# Patient Record
Sex: FEMALE | Race: WHITE | URBAN - NONMETROPOLITAN AREA
[De-identification: names, ages, dates, MRNs, and addresses within clinical notes are randomized per-mention and may not be internally consistent; named-entity substitution may affect disease eponyms.]

---

## 2017-01-03 ENCOUNTER — MED REC SCAN ONLY (OUTPATIENT)
Dept: FAMILY MEDICINE CLINIC | Facility: CLINIC | Age: 27
End: 2017-01-03

## 2017-03-09 NOTE — TELEPHONE ENCOUNTER
Pt would like to know if Dr. Tanvi Shultz will refill the citalopram and clonazepam until she finds psychiatrist in area  Meds pended

## 2017-03-10 RX ORDER — CITALOPRAM 20 MG/1
20 TABLET ORAL DAILY
Qty: 30 TABLET | Refills: 1 | OUTPATIENT
Start: 2017-03-10

## 2017-03-10 RX ORDER — CLONAZEPAM 1 MG/1
1 TABLET ORAL 2 TIMES DAILY
Qty: 60 TABLET | Refills: 1
Start: 2017-03-10

## 2017-03-10 NOTE — TELEPHONE ENCOUNTER
Please advise patient that as I have not seen her in 2 years she will need an appointment prior to any medication refills.

## 2017-03-10 NOTE — TELEPHONE ENCOUNTER
Spoke with pt's mom re: below. Mom is aware of the conversation below, so I advised her of his message. Mom isn't sure if pt still has [facility] and if so, who she has listed as PCP with them? ?  Mom is checking her [facility] card now.

## 2017-03-13 NOTE — TELEPHONE ENCOUNTER
Future Appointments  Date Time Provider Carla Thurston   3/15/2017 1:45 PM Adryan Win, DO [facility]

## 2017-05-09 ENCOUNTER — TELEPHONE (OUTPATIENT)
Dept: FAMILY MEDICINE CLINIC | Facility: CLINIC | Age: 27
End: 2017-05-09

## 2017-05-12 NOTE — PATIENT INSTRUCTIONS
I reviewed signs and symptoms of depression and anxiety as well as conservative management. I would strongly encouraged patient to go through counseling however she is very reluctant to talk about her recent loss.   I have asked her to keep a journal and t

## 2017-05-12 NOTE — PROGRESS NOTES
Jesus Pablo is a 32year old female. Patient presents with:   Anxiety: F/U ---    HPI:   Pt would like her rx refilled, pt has been going to a psychiatrist in [facility], Dr Malloy  Pt now living at home  Pt had a [facility] @ 4 months  , 4 yr mobility, Nose: turbinates clear, no dc, Throat: no erythema, pnd, or lesions  NECK: supple,no adenopathy,no bruits, no thyromegaly  LUNGS: clear to auscultation, no w/r/r  CARDIO: RRR without murmur  GI: good BS's,no masses, HSM or tenderness  Neuro:+fine minutes of a 30 minute appointment spent discussing above    The patient indicates understanding of these issues and agrees to the plan. Shanice Valdovinos.  Rosie Guadarrama, RAMONAFP

## 2017-05-17 ENCOUNTER — TELEPHONE (OUTPATIENT)
Dept: FAMILY MEDICINE CLINIC | Facility: CLINIC | Age: 27
End: 2017-05-17

## 2017-05-17 NOTE — TELEPHONE ENCOUNTER
Pt calls. States she was prescribed Seroquel 25mg qhs at her 5/12 OV. Reports she took 1 dose and ~30 minutes later she became very cold, had chills but was also sweating. +vomiting and had a blue-green discoloration under her eyes for a short time.   Pt d

## 2017-06-12 ENCOUNTER — TELEPHONE (OUTPATIENT)
Dept: FAMILY MEDICINE CLINIC | Facility: CLINIC | Age: 27
End: 2017-06-12

## 2017-07-10 RX ORDER — CLONAZEPAM 1 MG/1
1 TABLET ORAL 2 TIMES DAILY PRN
Qty: 60 TABLET | Refills: 1 | Status: SHIPPED
Start: 2017-07-10 | End: 2017-09-04

## 2017-07-11 ENCOUNTER — TELEPHONE (OUTPATIENT)
Dept: FAMILY MEDICINE CLINIC | Facility: CLINIC | Age: 27
End: 2017-07-11

## 2017-07-11 NOTE — TELEPHONE ENCOUNTER
[facility] DID NOT GET FAX YESTERDAY FOR CLONAZEPAM   PLEASE CALL AND LEAVE VOICEMAIL FOR PHARMACIST

## 2017-07-19 RX ORDER — CLONAZEPAM 1 MG/1
TABLET ORAL
Qty: 60 TABLET | Refills: 0
Start: 2017-07-19

## 2017-07-20 ENCOUNTER — TELEPHONE (OUTPATIENT)
Dept: FAMILY MEDICINE CLINIC | Facility: CLINIC | Age: 27
End: 2017-07-20
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
## 2017-07-20 NOTE — TELEPHONE ENCOUNTER
Please advise patient that her clonazepam is a controlled substance. Is her responsibility to keep her prescriptions in a secure location.   It is okay to refill early this 1 time

## 2017-09-05 ENCOUNTER — TELEPHONE (OUTPATIENT)
Dept: FAMILY MEDICINE CLINIC | Facility: CLINIC | Age: 27
End: 2017-09-05

## 2017-09-05 RX ORDER — CLONAZEPAM 1 MG/1
TABLET ORAL
Qty: 60 TABLET | Refills: 0 | Status: SHIPPED
Start: 2017-09-05 | End: 2017-09-29

## 2017-10-03 RX ORDER — CLONAZEPAM 1 MG/1
TABLET ORAL
Qty: 60 TABLET | Refills: 0 | Status: SHIPPED
Start: 2017-10-03 | End: 2017-10-31

## 2017-10-31 RX ORDER — CLONAZEPAM 1 MG/1
TABLET ORAL
Qty: 60 TABLET | Refills: 0 | Status: SHIPPED
Start: 2017-10-31 | End: 2018-03-12 | Stop reason: ALTCHOICE

## 2017-10-31 RX ORDER — CITALOPRAM 40 MG/1
40 TABLET ORAL DAILY
Qty: 30 TABLET | Refills: 5 | Status: SHIPPED | OUTPATIENT
Start: 2017-10-31 | End: 2017-12-01

## 2017-10-31 NOTE — TELEPHONE ENCOUNTER
Last clonazepam RF #60 on 10/3/17  Last citalopram RF #30 w/ 5 refills on 5/12/17    Last OV 5/12/17

## 2017-12-01 NOTE — PATIENT INSTRUCTIONS
I reviewed patient's previous medication history and response to medications.   We will continue citalopram 40 mg daily  We will increase clonazepam to 1 mg 3 times daily  I discussed the importance of routine and sleep hygiene  Of asked the patient to call

## 2017-12-01 NOTE — PROGRESS NOTES
Tio Oliva is a 32year old female. Patient presents with:   Anxiety: MED CHECK--    HPI:   Sleeping better but still has delayed onset, patient states it may take her 1-2 hours to fall asleep but when she does she sleeps restfully, has tried trazodone, nourished,in no apparent distress, well hydrated  SKIN: no rashes,no suspicious lesions  ENT: TMs: intact, good mobility, Nose: turbinates clear, no dc, Throat: no erythema, pnd, or lesions  NECK: supple,no adenopathy,no bruits, no thyromegaly  LUNGS: sharan

## 2017-12-27 ENCOUNTER — TELEPHONE (OUTPATIENT)
Dept: FAMILY MEDICINE CLINIC | Facility: CLINIC | Age: 27
End: 2017-12-27

## 2017-12-27 NOTE — TELEPHONE ENCOUNTER
Citalopram Hydrobromide 40 MG Oral Tab   ClonazePAM 1 MG Oral Tab   PLEASE SEND REFILLS TO [facility]

## 2017-12-27 NOTE — TELEPHONE ENCOUNTER
Pt advised she has refills on citalopram available @ [facility]-----she needs to call them to get a refill ready. *last clonazepam RF was #90 on 12/1. Pt states she has several left. ADVISED DR. GUAJARDO WILL MOST LIKELY NOT REFILL THIS UNTIL 12/30.    PT IS

## 2017-12-28 RX ORDER — CLONAZEPAM 1 MG/1
1 TABLET ORAL 3 TIMES DAILY PRN
Qty: 90 TABLET | Refills: 0 | Status: SHIPPED | OUTPATIENT
Start: 2017-12-30 | End: 2018-01-03

## 2018-01-03 ENCOUNTER — TELEPHONE (OUTPATIENT)
Dept: FAMILY MEDICINE CLINIC | Facility: CLINIC | Age: 28
End: 2018-01-03

## 2018-01-03 RX ORDER — CLONAZEPAM 1 MG/1
1 TABLET ORAL 3 TIMES DAILY PRN
Qty: 90 TABLET | Refills: 0 | COMMUNITY
Start: 2018-01-03 | End: 2018-01-16

## 2018-01-03 NOTE — TELEPHONE ENCOUNTER
PHARMACY STATES THEY HAVE AN OLD RX FOR ClonazePAM.  THEY ADVISED PT TO CALL DRS OFFICE TO GET A NEW PX FOR 3 X A DAY. PHARMACY DID FILL RX BUT FOR 2 X A DAY. PT WANTS TO GET A RX FOR THE REMAINING DOSAGE.   PLEASE ADVISE

## 2018-01-03 NOTE — TELEPHONE ENCOUNTER
Pt calling because she only got #60 of Klonopin with BID directions from 12/30 refill. States it should have been #90 with TID directions. *PC to [facility], spoke with Joe. She states pt got #60 w/ BID sig on 10/31, then #90 w/ TID sig on 12/1.    A

## 2018-01-08 ENCOUNTER — TELEPHONE (OUTPATIENT)
Dept: FAMILY MEDICINE CLINIC | Facility: CLINIC | Age: 28
End: 2018-01-08

## 2018-01-08 RX ORDER — CLONAZEPAM 1 MG/1
TABLET ORAL
Qty: 90 TABLET | Refills: 0
Start: 2018-01-08

## 2018-01-08 NOTE — TELEPHONE ENCOUNTER
Pt got the ClonazePAM but the pharmacy messed it up and only gave her the script that has instructions for 2x daily. They told her to call Dr. Michelet Rae and see if he can do another script for the additional so she can take 3x daily as prescribed.      Pt is

## 2018-01-08 NOTE — TELEPHONE ENCOUNTER
Last office visit 12-1-17   Note in chart states: Pt only got #60 on 12/30/17----[facility]. Filled an old refill on file.  Pt did NOT get #90, so she will call when she needs a refill

## 2018-01-08 NOTE — TELEPHONE ENCOUNTER
Pt calls----states she will be traveling out of state for work. Jerry told her she needs to have refills on Klonopin. Advised to call us from where she is when she needs a refill, and we can send refill to a pharmacy close to her.

## 2018-01-16 RX ORDER — CLONAZEPAM 1 MG/1
1 TABLET ORAL 3 TIMES DAILY PRN
Qty: 90 TABLET | Refills: 0 | Status: SHIPPED
Start: 2018-01-16 | End: 2018-02-12

## 2018-01-16 NOTE — TELEPHONE ENCOUNTER
See below. Pt called on 1/3---said she only got #60 with BID directions instead of #90 with TID directions on 12/30. Pt is calling now for either the add'l #30 tabs OR a new script for #90.

## 2018-01-16 NOTE — TELEPHONE ENCOUNTER
ClonazePAM 1 MG    Jerry only gave her 60 instead of 90 so she was told to call when she was getting low. Please call in for the remaining 30. Pt it out of state for work, please send to pharmacy below.      Robert Glass in [place], [place]   Ph: [phone]

## 2018-02-12 RX ORDER — CITALOPRAM 40 MG/1
40 TABLET ORAL DAILY
Qty: 30 TABLET | Refills: 0 | Status: SHIPPED | OUTPATIENT
Start: 2018-02-12 | End: 2018-03-12

## 2018-02-14 RX ORDER — CLONAZEPAM 1 MG/1
1 TABLET ORAL 3 TIMES DAILY PRN
Qty: 90 TABLET | Refills: 0 | Status: SHIPPED
Start: 2018-02-14 | End: 2018-03-12

## 2018-02-14 NOTE — TELEPHONE ENCOUNTER
Sadie West needs clonazepam sent to [facility] at [address] [phone].

## 2018-02-20 ENCOUNTER — TELEPHONE (OUTPATIENT)
Dept: FAMILY MEDICINE CLINIC | Facility: CLINIC | Age: 28
End: 2018-02-20

## 2018-03-12 RX ORDER — CLONAZEPAM 1 MG/1
1 TABLET ORAL 3 TIMES DAILY PRN
Qty: 90 TABLET | Refills: 0 | Status: SHIPPED
Start: 2018-03-12 | End: 2018-04-11

## 2018-03-12 RX ORDER — CITALOPRAM 40 MG/1
40 TABLET ORAL DAILY
Qty: 30 TABLET | Refills: 0 | Status: SHIPPED | OUTPATIENT
Start: 2018-03-12 | End: 2018-04-24

## 2018-04-11 RX ORDER — CLONAZEPAM 1 MG/1
1 TABLET ORAL 3 TIMES DAILY PRN
Qty: 90 TABLET | Refills: 0 | Status: SHIPPED
Start: 2018-04-11 | End: 2018-05-08

## 2018-04-24 ENCOUNTER — TELEPHONE (OUTPATIENT)
Dept: FAMILY MEDICINE CLINIC | Facility: CLINIC | Age: 28
End: 2018-04-24

## 2018-04-24 RX ORDER — CITALOPRAM 40 MG/1
40 TABLET ORAL DAILY
Qty: 30 TABLET | Refills: 0 | Status: SHIPPED | OUTPATIENT
Start: 2018-04-24

## 2018-05-08 NOTE — TELEPHONE ENCOUNTER
Last RF #90 on 4/11. Pt knows she is a few days early. She called to scheduled an appt tomorrow for an eye issue, so she requested a refill while she was on the phone.  She knows it will be a few days

## 2018-05-09 RX ORDER — CLONAZEPAM 1 MG/1
1 TABLET ORAL 3 TIMES DAILY PRN
Qty: 90 TABLET | Refills: 0 | Status: SHIPPED | OUTPATIENT
Start: 2018-05-10 | End: 2018-06-09

## 2018-06-06 ENCOUNTER — TELEPHONE (OUTPATIENT)
Dept: FAMILY MEDICINE CLINIC | Facility: CLINIC | Age: 28
End: 2018-06-06

## 2018-06-06 NOTE — TELEPHONE ENCOUNTER
Citalopram Hydrobromide 40 MG Oral Tab  ClonazePAM 1 MG Oral Tab  Pt. Asking if Dr. Kate Alex will refill these for her. Call Jerry in [place]   Pt.  Has [facility] [employer] we can not take) she mentioned in the conversation that she has established w

## 2022-12-27 PROBLEM — F41.9 ANXIETY: Status: ACTIVE | Noted: 2019-05-03

## 2022-12-27 NOTE — PATIENT INSTRUCTIONS
I queried the [facility] for controlled substance use  I reviewed signs and symptoms as well as contributing factors and conservative management strategies for ADHD. Discussed organizational skills  Patient scores extremely high on both impulsivity and inattentive this as well as distractibility questionnaire. Would recommend resuming Adderall extended release 15 mg every morning. We will titrate slowly to lowest effective dose  I have asked patient to call or report an update in response to medication within the next 2 weeks  Patient may also benefit from the addition of quetiapine or other daily controller medications for anxiety  Discussed contributing factors for ankle pain. Patient was instructed in proper hamstring, calf, heel cord and plantar tendon stretches  I have asked patient to check with her insurance referral coordinator for participating podiatrist in the area  A total of 40 minutes was spent with the patient.   Please schedule follow-up to discuss other issues

## 2023-01-04 ENCOUNTER — MED REC SCAN ONLY (OUTPATIENT)
Dept: FAMILY MEDICINE CLINIC | Facility: CLINIC | Age: 33
End: 2023-01-04

## 2023-01-09 ENCOUNTER — PATIENT MESSAGE (OUTPATIENT)
Dept: FAMILY MEDICINE CLINIC | Facility: CLINIC | Age: 33
End: 2023-01-09

## 2023-01-09 ENCOUNTER — TELEPHONE (OUTPATIENT)
Dept: FAMILY MEDICINE CLINIC | Facility: CLINIC | Age: 33
End: 2023-01-09

## 2023-01-09 RX ORDER — DEXTROAMPHETAMINE SACCHARATE, AMPHETAMINE ASPARTATE MONOHYDRATE, DEXTROAMPHETAMINE SULFATE AND AMPHETAMINE SULFATE 5; 5; 5; 5 MG/1; MG/1; MG/1; MG/1
20 CAPSULE, EXTENDED RELEASE ORAL EVERY MORNING
Qty: 10 CAPSULE | Refills: 0 | Status: SHIPPED | OUTPATIENT
Start: 2023-01-09

## 2023-01-09 RX ORDER — DEXTROAMPHETAMINE SACCHARATE, AMPHETAMINE ASPARTATE MONOHYDRATE, DEXTROAMPHETAMINE SULFATE AND AMPHETAMINE SULFATE 5; 5; 5; 5 MG/1; MG/1; MG/1; MG/1
20 CAPSULE, EXTENDED RELEASE ORAL EVERY MORNING
Qty: 18 CAPSULE | Refills: 0 | Status: SHIPPED | OUTPATIENT
Start: 2023-01-09

## 2023-01-09 RX ORDER — DEXTROAMPHETAMINE SACCHARATE, AMPHETAMINE ASPARTATE MONOHYDRATE, DEXTROAMPHETAMINE SULFATE AND AMPHETAMINE SULFATE 5; 5; 5; 5 MG/1; MG/1; MG/1; MG/1
20 CAPSULE, EXTENDED RELEASE ORAL EVERY MORNING
Qty: 28 CAPSULE | Refills: 0 | Status: SHIPPED | OUTPATIENT
Start: 2023-01-09 | End: 2023-01-09

## 2023-01-09 NOTE — TELEPHONE ENCOUNTER
See pt's message below. She now wants #10 sent to [facility] on [address] and #18 to [facility]. I can't que up/ get both quantities ready, so here is the #10 to [facility].    Then you can send the #18 to [facility]

## 2023-01-09 NOTE — TELEPHONE ENCOUNTER
From: Jameel Joshi  Sent: 1/9/2023 1:12 PM CST  To: Mikaela Collins Clinical Staff  Subject: Meds. Okay, sooo, I called 4 pharmacies and they're all out. The [facility] [address] said they have 10 in stock, and to send the prescription over there.

## 2023-01-09 NOTE — TELEPHONE ENCOUNTER
From: [place]  To: Thong Salmon. Easton Brewer DO  Sent: 1/9/2023 10:45 AM CST  Subject: Meds. Trey, Dr. Easton Brewer! I'm still thinking 15mg. for the adderall is a little too low. It does help a tiny bit, though. Also, I do need a refill. Just let me know the plan.  Thank you!   -Lolis Mora

## 2023-02-06 RX ORDER — DEXTROAMPHETAMINE SACCHARATE, AMPHETAMINE ASPARTATE MONOHYDRATE, DEXTROAMPHETAMINE SULFATE AND AMPHETAMINE SULFATE 5; 5; 5; 5 MG/1; MG/1; MG/1; MG/1
20 CAPSULE, EXTENDED RELEASE ORAL EVERY MORNING
Qty: 28 CAPSULE | Refills: 0 | Status: SHIPPED | OUTPATIENT
Start: 2023-02-06 | End: 2023-02-07

## 2023-02-07 ENCOUNTER — PATIENT MESSAGE (OUTPATIENT)
Dept: FAMILY MEDICINE CLINIC | Facility: CLINIC | Age: 33
End: 2023-02-07

## 2023-02-07 RX ORDER — DEXTROAMPHETAMINE SACCHARATE, AMPHETAMINE ASPARTATE MONOHYDRATE, DEXTROAMPHETAMINE SULFATE AND AMPHETAMINE SULFATE 5; 5; 5; 5 MG/1; MG/1; MG/1; MG/1
20 CAPSULE, EXTENDED RELEASE ORAL EVERY MORNING
Qty: 28 CAPSULE | Refills: 0 | Status: SHIPPED | OUTPATIENT
Start: 2023-02-07

## 2023-02-07 NOTE — TELEPHONE ENCOUNTER
From: [place]  To: Thong Salmon. Easton Brewer DO  Sent: 2/7/2023 1:23 PM CST  Subject: Gregg Headley, Dr. Easton Brewer. I called the office yesterday, and told them that j needed the adderall sent to [facility] in [place].  It was sent to [facility] again, and they are out

## 2023-02-22 RX ORDER — CLONAZEPAM 0.5 MG/1
0.5 TABLET ORAL 2 TIMES DAILY PRN
Refills: 0 | OUTPATIENT
Start: 2023-02-22

## 2023-02-22 NOTE — TELEPHONE ENCOUNTER
Clonazepam refusal reason: \"Prescriber not at this practice\"    Please advise what to tell pt for reason of denial.    Thank you.

## 2023-02-22 NOTE — TELEPHONE ENCOUNTER
Clonazepam refused because it has been prescribed by another provider, most recently February 3.   I am not going to prescribe it

## 2023-02-23 RX ORDER — CLONAZEPAM 0.5 MG/1
0.5 TABLET ORAL 2 TIMES DAILY PRN
Refills: 0 | OUTPATIENT
Start: 2023-02-23

## 2023-02-23 NOTE — TELEPHONE ENCOUNTER
Clonazepam 0.5 MG oral tab [facility]    Last office visit: 12/27/22  No future appointments.   Last labs: 5/3/22

## 2023-02-23 NOTE — TELEPHONE ENCOUNTER
Dr. Chemo Jensen denied your refill request. His response is \"Clonazepam refuled because it has been prescribed by another provider, most recently on Feb. 3.  I am not going to prescribe this. \"

## 2023-02-24 NOTE — TELEPHONE ENCOUNTER
All refills need to be forwarded to the prescriber.   If she is running out early or requests a longer refill, that needs to be relayed to the prescriber

## 2023-02-27 ENCOUNTER — TELEPHONE (OUTPATIENT)
Dept: FAMILY MEDICINE CLINIC | Facility: CLINIC | Age: 33
End: 2023-02-27

## 2023-02-27 NOTE — TELEPHONE ENCOUNTER
[employer] message sent to pt re: below. *PLEASE SEE HER LAST MESSAGE BACK TO YOU    **PHONE CALL TO PT TO DISCUSS THIS FURTHER, as there seems to be some sort of miscommunication. Pt states she was seeing Alvin Coffey, nurse practitioner, part of [facility] in [facility]. Reports her insurance changed, so she has now changed to Dr. Veronique Robbins as her PCP. States that last refill on 2/3 was for #40 to last her 20 days. She then expected Dr. Veronique Robbins to take over refilling it. States after not being able to get it refilled last week by us, she contacted Cheryl nicholas to ask for 1 last refill because her new PCP wouldn't do it. *Pt states Alvin Campbellalsherron did send a refill TODAY for #40, to last the next 20days. Pt will c/b when she runs out and hopes that you will then prescribe it for her. *DO YOU WANT TO SEE HER BEFORE THEN TO DISCUSS FURTHER?  HER LAST APPT WAS ON 12/27/22

## 2023-02-27 NOTE — TELEPHONE ENCOUNTER
I did not refill the clonazepam after the December appointment as patient states she was no longer seeing this provider and then 2 additional refills showed up under that providers name. There should only be 1 provider refilling controlled medication.   If she would like me to continue, then I will take over that prescription

## 2023-03-13 RX ORDER — DEXTROAMPHETAMINE SACCHARATE, AMPHETAMINE ASPARTATE MONOHYDRATE, DEXTROAMPHETAMINE SULFATE AND AMPHETAMINE SULFATE 5; 5; 5; 5 MG/1; MG/1; MG/1; MG/1
20 CAPSULE, EXTENDED RELEASE ORAL EVERY MORNING
Qty: 28 CAPSULE | Refills: 0 | Status: SHIPPED | OUTPATIENT
Start: 2023-03-13

## 2023-03-13 NOTE — TELEPHONE ENCOUNTER
No protocol    Last office visit:  12/27/22  Last refill:  02/07/22  #28, no refills  Last lab:  05/03/22    No future appointments.

## 2023-03-22 RX ORDER — CLONAZEPAM 0.5 MG/1
0.5 TABLET ORAL 2 TIMES DAILY PRN
Qty: 30 TABLET | Refills: 0 | Status: SHIPPED | OUTPATIENT
Start: 2023-03-22

## 2023-03-31 ENCOUNTER — OFFICE VISIT (OUTPATIENT)
Dept: FAMILY MEDICINE CLINIC | Facility: CLINIC | Age: 33
End: 2023-03-31
Payer: MEDICAID

## 2023-03-31 VITALS
WEIGHT: 225 LBS | RESPIRATION RATE: 18 BRPM | DIASTOLIC BLOOD PRESSURE: 80 MMHG | HEIGHT: 62 IN | SYSTOLIC BLOOD PRESSURE: 120 MMHG | OXYGEN SATURATION: 97 % | TEMPERATURE: 99 F | HEART RATE: 105 BPM | BODY MASS INDEX: 41.41 KG/M2

## 2023-03-31 DIAGNOSIS — R35.0 URINARY FREQUENCY: Primary | ICD-10-CM

## 2023-03-31 DIAGNOSIS — F90.2 ATTENTION DEFICIT HYPERACTIVITY DISORDER (ADHD), COMBINED TYPE: ICD-10-CM

## 2023-03-31 LAB
BILIRUBIN: NEGATIVE
GLUCOSE (URINE DIPSTICK): NEGATIVE MG/DL
KETONES (URINE DIPSTICK): NEGATIVE MG/DL
MULTISTIX LOT#: ABNORMAL NUMERIC
NITRITE, URINE: NEGATIVE
PH, URINE: 6.5 (ref 4.5–8)
PROTEIN (URINE DIPSTICK): NEGATIVE MG/DL
SPECIFIC GRAVITY: 1.01 (ref 1–1.03)
URINE-COLOR: YELLOW
UROBILINOGEN,SEMI-QN: 0.2 MG/DL (ref 0–1.9)

## 2023-03-31 PROCEDURE — 87086 URINE CULTURE/COLONY COUNT: CPT | Performed by: FAMILY MEDICINE

## 2023-03-31 RX ORDER — SULFAMETHOXAZOLE AND TRIMETHOPRIM 800; 160 MG/1; MG/1
1 TABLET ORAL 2 TIMES DAILY
Qty: 6 TABLET | Refills: 0 | Status: SHIPPED | OUTPATIENT
Start: 2023-03-31 | End: 2023-04-03

## 2023-03-31 NOTE — PATIENT INSTRUCTIONS
Urine results reviewed. Urine sent for culture  We will start Bactrim DS twice daily x3 days  Push fluids, avoid holding urine, void after sex  Will increase Adderall extended release to 25 mg every morning with next refill. If necessary a second dose of an immediate release preparation may be used on an as-needed basis.

## 2023-04-04 RX ORDER — CLONAZEPAM 0.5 MG/1
0.5 TABLET ORAL 2 TIMES DAILY PRN
Qty: 56 TABLET | Refills: 0 | Status: SHIPPED | OUTPATIENT
Start: 2023-04-04

## 2023-04-04 NOTE — TELEPHONE ENCOUNTER
Clonazepam 0.5 MG oral tab    Last office visit: 3/31/23  No future appointments.   Last filled: 3/22/23  #30 with 0 refills

## 2023-04-14 ENCOUNTER — TELEPHONE (OUTPATIENT)
Dept: FAMILY MEDICINE CLINIC | Facility: CLINIC | Age: 33
End: 2023-04-14

## 2023-04-14 RX ORDER — DEXTROAMPHETAMINE SACCHARATE, AMPHETAMINE ASPARTATE MONOHYDRATE, DEXTROAMPHETAMINE SULFATE AND AMPHETAMINE SULFATE 6.25; 6.25; 6.25; 6.25 MG/1; MG/1; MG/1; MG/1
25 CAPSULE, EXTENDED RELEASE ORAL EVERY MORNING
Qty: 28 CAPSULE | Refills: 0 | Status: SHIPPED | OUTPATIENT
Start: 2023-04-14

## 2023-04-14 NOTE — TELEPHONE ENCOUNTER
Patient needs a refill on Adderall currently taking 20 mg daily, she states you were going to increase the dose?  Please advise

## 2023-04-14 NOTE — TELEPHONE ENCOUNTER
Amphetamine-Dextroamphet  - Pt states dr was going to increase the dosage first.  -  [facility]

## 2023-04-20 ENCOUNTER — TELEPHONE (OUTPATIENT)
Dept: FAMILY MEDICINE CLINIC | Facility: CLINIC | Age: 33
End: 2023-04-20

## 2023-04-21 ENCOUNTER — MED REC SCAN ONLY (OUTPATIENT)
Dept: FAMILY MEDICINE CLINIC | Facility: CLINIC | Age: 33
End: 2023-04-21

## 2023-04-28 NOTE — TELEPHONE ENCOUNTER
clonazePAM 0.5 MG Oral Tab    LOV  3-31-23    LAST RX  4-4-23  #56    Next OV  No future appointments.

## 2023-04-29 RX ORDER — CLONAZEPAM 0.5 MG/1
0.5 TABLET ORAL 2 TIMES DAILY PRN
Qty: 56 TABLET | Refills: 0 | Status: SHIPPED | OUTPATIENT
Start: 2023-04-29

## 2023-05-02 RX ORDER — CITALOPRAM 40 MG/1
40 TABLET ORAL DAILY
Qty: 30 TABLET | Refills: 0 | Status: SHIPPED | OUTPATIENT
Start: 2023-05-02

## 2023-05-13 NOTE — TELEPHONE ENCOUNTER
Amphetamine-Dextroamphet ER 25 MG Oral Capsule SR 24 Hr    Last office visit:  12/27/22    No future appointments.   Last filled:  4/14/23  #28  With 0 refills   Last labs:

## 2023-05-15 RX ORDER — DEXTROAMPHETAMINE SACCHARATE, AMPHETAMINE ASPARTATE MONOHYDRATE, DEXTROAMPHETAMINE SULFATE AND AMPHETAMINE SULFATE 6.25; 6.25; 6.25; 6.25 MG/1; MG/1; MG/1; MG/1
25 CAPSULE, EXTENDED RELEASE ORAL EVERY MORNING
Qty: 28 CAPSULE | Refills: 0 | Status: SHIPPED | OUTPATIENT
Start: 2023-05-15

## 2023-05-26 ENCOUNTER — PATIENT MESSAGE (OUTPATIENT)
Dept: FAMILY MEDICINE CLINIC | Facility: CLINIC | Age: 33
End: 2023-05-26

## 2023-05-27 RX ORDER — CLONAZEPAM 0.5 MG/1
0.5 TABLET ORAL 2 TIMES DAILY PRN
Qty: 56 TABLET | Refills: 0 | Status: CANCELLED | OUTPATIENT
Start: 2023-05-27

## 2023-05-27 RX ORDER — CLONAZEPAM 0.5 MG/1
0.5 TABLET ORAL 2 TIMES DAILY PRN
Qty: 56 TABLET | Refills: 0 | Status: SHIPPED | OUTPATIENT
Start: 2023-05-27

## 2023-05-27 RX ORDER — CLONAZEPAM 0.5 MG/1
TABLET ORAL
Qty: 56 TABLET | Refills: 0 | OUTPATIENT
Start: 2023-05-27

## 2023-05-27 NOTE — TELEPHONE ENCOUNTER
From: Koki Farias  To: Vandana Jones DO  Sent: 5/26/2023 3:44 PM CDT  Subject: Medication    Dr. Tavia Jones, I am wondering what is going on with my clonazepam refill. I am out, have called twice and sent multiple refill requests to you. Please, get back to me. Thanks.     Pura Terrazas

## 2023-06-12 ENCOUNTER — TELEPHONE (OUTPATIENT)
Dept: FAMILY MEDICINE CLINIC | Facility: CLINIC | Age: 33
End: 2023-06-12

## 2023-06-12 RX ORDER — DEXTROAMPHETAMINE SACCHARATE, AMPHETAMINE ASPARTATE MONOHYDRATE, DEXTROAMPHETAMINE SULFATE AND AMPHETAMINE SULFATE 6.25; 6.25; 6.25; 6.25 MG/1; MG/1; MG/1; MG/1
25 CAPSULE, EXTENDED RELEASE ORAL EVERY MORNING
Qty: 28 CAPSULE | Refills: 0 | Status: CANCELLED | OUTPATIENT
Start: 2023-06-12

## 2023-06-13 RX ORDER — DEXTROAMPHETAMINE SACCHARATE, AMPHETAMINE ASPARTATE, DEXTROAMPHETAMINE SULFATE AND AMPHETAMINE SULFATE 5; 5; 5; 5 MG/1; MG/1; MG/1; MG/1
20 TABLET ORAL 2 TIMES DAILY
Qty: 56 TABLET | Refills: 0 | Status: SHIPPED | OUTPATIENT
Start: 2023-06-13 | End: 2023-07-11

## 2023-06-13 NOTE — TELEPHONE ENCOUNTER
Spoke with patient - informed of change in dosage and time of medication. Twice a day now - not extended release  Verbalized understanding.   Electronic PA submitted

## 2023-06-13 NOTE — TELEPHONE ENCOUNTER
Please advise patient that I would recommend changing to twice daily dosing of immediate release Adderall 20 mg.  Prescription sent to the pharmacy

## 2023-06-14 ENCOUNTER — TELEPHONE (OUTPATIENT)
Dept: FAMILY MEDICINE CLINIC | Facility: CLINIC | Age: 33
End: 2023-06-14

## 2023-06-14 NOTE — TELEPHONE ENCOUNTER
Received approved prior authorization for Adderall 20mg effective through 6/14/23    Case # PA - [id number]

## 2023-06-16 NOTE — TELEPHONE ENCOUNTER
Additional information sent through cover my meds  [id number] - PA Case ID: [id number] - Rx #: [id number]

## 2023-06-21 NOTE — TELEPHONE ENCOUNTER
CLONAZEPAM 0.5 MG Oral Tab    LOV  3-31-23    LAST RX  5-27-23  #56    Next OV No future appointments.

## 2023-06-23 RX ORDER — CLONAZEPAM 0.5 MG/1
TABLET ORAL
Qty: 56 TABLET | Refills: 0 | Status: SHIPPED | OUTPATIENT
Start: 2023-06-23

## 2023-07-12 RX ORDER — DEXTROAMPHETAMINE SACCHARATE, AMPHETAMINE ASPARTATE, DEXTROAMPHETAMINE SULFATE AND AMPHETAMINE SULFATE 5; 5; 5; 5 MG/1; MG/1; MG/1; MG/1
20 TABLET ORAL 2 TIMES DAILY
Qty: 56 TABLET | Refills: 0 | Status: SHIPPED | OUTPATIENT
Start: 2023-07-12 | End: 2023-08-09

## 2023-07-12 NOTE — TELEPHONE ENCOUNTER
Requested Prescriptions     Pending Prescriptions Disp Refills    amphetamine-dextroamphetamine 20 MG Oral Tab 56 tablet 0     Sig: Take 1 tablet (20 mg total) by mouth 2 (two) times daily for 28 days. Last refill 6/13/23 #56  LOV 3/31/23  No future appointments.

## 2023-07-19 RX ORDER — CITALOPRAM 40 MG/1
40 TABLET ORAL DAILY
Qty: 90 TABLET | Refills: 3 | Status: SHIPPED | OUTPATIENT
Start: 2023-07-19

## 2023-07-19 NOTE — TELEPHONE ENCOUNTER
citalopram 40 MG Oral Tab     LOV  3-31-23    LAST RX  5-2-23  #30    Next OV No future appointments.     Routed to provider

## 2023-07-19 NOTE — TELEPHONE ENCOUNTER
clonazePAM 0.5 MG Oral Tab     LOV  3-31-23    LAST RX  6-23-23  #56    Next OV  No future appointments. Please see pt comment   Patient Comment: Can this be upped a tiny bit?     Routed to provider

## 2023-07-20 RX ORDER — CLONAZEPAM 0.5 MG/1
0.5 TABLET ORAL 2 TIMES DAILY PRN
Qty: 56 TABLET | Refills: 0 | Status: SHIPPED | OUTPATIENT
Start: 2023-07-20

## 2023-08-07 NOTE — TELEPHONE ENCOUNTER
amphetamine-dextroamphetamine 20 MG Oral Tab     Last office visit:  3/31/23  No future appointments.   Last filled:  7/12/23  #56 with 0 refills   Last labs:      Camryn Lustkayla and set up a physical.   Future Appointments   Date Time Provider Carla Thurston   9/7/2023 10:45 AM Pooja Hernandez., DO [facility] none

## 2023-08-08 RX ORDER — DEXTROAMPHETAMINE SACCHARATE, AMPHETAMINE ASPARTATE, DEXTROAMPHETAMINE SULFATE AND AMPHETAMINE SULFATE 5; 5; 5; 5 MG/1; MG/1; MG/1; MG/1
20 TABLET ORAL 2 TIMES DAILY
Qty: 56 TABLET | Refills: 0 | Status: SHIPPED | OUTPATIENT
Start: 2023-08-08 | End: 2023-09-05

## 2023-08-16 RX ORDER — CLONAZEPAM 0.5 MG/1
0.5 TABLET ORAL 2 TIMES DAILY PRN
Qty: 56 TABLET | Refills: 0 | Status: SHIPPED | OUTPATIENT
Start: 2023-08-16

## 2023-09-09 ENCOUNTER — PATIENT MESSAGE (OUTPATIENT)
Dept: FAMILY MEDICINE CLINIC | Facility: CLINIC | Age: 33
End: 2023-09-09

## 2023-09-11 ENCOUNTER — TELEPHONE (OUTPATIENT)
Dept: FAMILY MEDICINE CLINIC | Facility: CLINIC | Age: 33
End: 2023-09-11

## 2023-09-11 RX ORDER — DEXTROAMPHETAMINE SACCHARATE, AMPHETAMINE ASPARTATE, DEXTROAMPHETAMINE SULFATE AND AMPHETAMINE SULFATE 5; 5; 5; 5 MG/1; MG/1; MG/1; MG/1
20 TABLET ORAL 2 TIMES DAILY
Qty: 56 TABLET | Refills: 0 | Status: SHIPPED | OUTPATIENT
Start: 2023-09-11 | End: 2023-10-09

## 2023-09-11 NOTE — TELEPHONE ENCOUNTER
Pt also sent a [facility] requesting this.   That encounter was forwarded to Dr. Char Siemens

## 2023-09-13 RX ORDER — CLONAZEPAM 0.5 MG/1
0.5 TABLET ORAL 2 TIMES DAILY PRN
Qty: 56 TABLET | Refills: 0 | Status: SHIPPED | OUTPATIENT
Start: 2023-09-13

## 2023-10-09 RX ORDER — DEXTROAMPHETAMINE SACCHARATE, AMPHETAMINE ASPARTATE, DEXTROAMPHETAMINE SULFATE AND AMPHETAMINE SULFATE 5; 5; 5; 5 MG/1; MG/1; MG/1; MG/1
20 TABLET ORAL 2 TIMES DAILY
Qty: 56 TABLET | Refills: 0 | Status: SHIPPED | OUTPATIENT
Start: 2023-10-09 | End: 2023-11-06

## 2023-10-09 NOTE — TELEPHONE ENCOUNTER
amphetamine-dextroamphetamine 20 MG Oral Tab    Last office visit:  12/27/22    No future appointments.   Last filled:  9/11/23  #56 with 0 refills   Last labs:

## 2023-10-09 NOTE — TELEPHONE ENCOUNTER
CLONAZEPAM 0.5 MG Oral Tab     Last office visit:  12/27/22  No future appointments.   Last filled:  9/13/23  #56 with 0 refills   Last labs:

## 2023-10-10 RX ORDER — CLONAZEPAM 0.5 MG/1
0.5 TABLET ORAL 2 TIMES DAILY PRN
Qty: 56 TABLET | Refills: 0 | Status: SHIPPED | OUTPATIENT
Start: 2023-10-10 | End: 2023-10-13 | Stop reason: RX

## 2023-10-13 ENCOUNTER — TELEPHONE (OUTPATIENT)
Dept: FAMILY MEDICINE CLINIC | Facility: CLINIC | Age: 33
End: 2023-10-13

## 2023-10-13 RX ORDER — CLONAZEPAM 0.5 MG/1
0.5 TABLET ORAL 2 TIMES DAILY PRN
Qty: 56 TABLET | Refills: 0 | Status: SHIPPED | OUTPATIENT
Start: 2023-10-13 | End: 2023-10-16

## 2023-10-13 NOTE — TELEPHONE ENCOUNTER
clonazePAM - This medication was refilled a few days ago but it is out of stock. The pharmacy has 1 mg. Can we resend for 1 mg & she will cut in half? - [facility] [place].

## 2023-10-13 NOTE — TELEPHONE ENCOUNTER
Clonazepam 1 mg tablets are not to be cut. I cannot prescribe it as a half a tablet. I can prescribe brand-name Klonopin or she can try a different pharmacy. Michael Philip.  Juju Sloan

## 2023-10-16 RX ORDER — CLONAZEPAM 0.5 MG/1
0.5 TABLET ORAL 2 TIMES DAILY PRN
Qty: 56 TABLET | Refills: 0 | Status: SHIPPED | OUTPATIENT
Start: 2023-10-16 | End: 2023-11-13

## 2023-10-16 RX ORDER — CLONAZEPAM 0.5 MG/1
0.5 TABLET ORAL 2 TIMES DAILY PRN
Qty: 56 TABLET | Refills: 0 | OUTPATIENT
Start: 2023-10-16 | End: 2023-11-13

## 2023-10-16 NOTE — TELEPHONE ENCOUNTER
Last OV 3/31/23    No future appts scheduled   (Pt has canceled her last 2 px appts---9/7/23 and 9/27/23)     A refill was sent to [facility] on 10/13/23, bit they didn't have in stock.     Pt states [facility] in [place] has this available

## 2023-10-18 ENCOUNTER — TELEPHONE (OUTPATIENT)
Dept: FAMILY MEDICINE CLINIC | Facility: CLINIC | Age: 33
End: 2023-10-18

## 2023-10-18 RX ORDER — ACETAMINOPHEN AND CODEINE PHOSPHATE 300; 30 MG/1; MG/1
1 TABLET ORAL EVERY 6 HOURS PRN
Qty: 12 TABLET | Refills: 0 | Status: SHIPPED | OUTPATIENT
Start: 2023-10-18

## 2023-10-18 NOTE — TELEPHONE ENCOUNTER
PC to pt. Spoke with pt. Pt states she discovered one of her lower right molares had cracked. Pt states she doesn't know when it happened as she found it when she was brushing her teeth and one of the bristles got stuck. Pt describes the pain as a dull ache all the time with an occasional sharp pain. Pt states she has been having headaches for about a week but isn't sure if that is related to medication withdrawal as her pharmacy has been out of two of her medications. Pt states this tooth was fixed 2 years ago. Pt mentioned a wisdom tooth had come in 2 weeks ago but crumbled while eating. Pt denies any pain but does have one piece that is sticking out that cuts her cheek. Pt states she does see pus on the right cheek. Pt denies any facial swelling. Pt mentioned her right ear does get hot occasionally. Pt is taking aspirin and ibuprofen with some relief but doesn't last. Pt describes her pain level at a 6 and occasionally feels the pain down her jaw. Pt is using Sensodyne toothpaste. Pt is wondering if there is anything else she can be doing until she can hear back from her insurance re: finding a dentist.    Please advise.

## 2023-10-18 NOTE — TELEPHONE ENCOUNTER
Pt tooth cracked in half, she is currently waiting on insurance to get back to her in regards to what dentist she can see and is wanting to know if Dr can recommend what she take in the meantime to reduce the pain.

## 2023-10-18 NOTE — TELEPHONE ENCOUNTER
She ultimately needs to follow-up with her dentist.  Ibuprofen 800 mg every 8 hours as needed for pain, avoid overly cold liquids. Continue with the Sensodyne toothpaste, she may use Tylenol with codeine for severe pain.   Please warn of sedation with codeine as well as possible nausea

## 2023-10-20 ENCOUNTER — TELEPHONE (OUTPATIENT)
Dept: FAMILY MEDICINE CLINIC | Facility: CLINIC | Age: 33
End: 2023-10-20

## 2023-10-20 NOTE — TELEPHONE ENCOUNTER
Received a fax from [employer] stating that a prior auth is needed for patient's Clonazepam. Prior auth submitted via epic. Awaiting authorization determination.      If need to go onto covermymeds, the arreaga is:    Navin Iraheta  11/21/1990

## 2023-10-20 NOTE — TELEPHONE ENCOUNTER
Received a fax from [facility] stating a prior Mary Dial is not needed at this time. Pharmacy notified.

## 2023-10-25 RX ORDER — ACETAMINOPHEN AND CODEINE PHOSPHATE 300; 30 MG/1; MG/1
1 TABLET ORAL EVERY 6 HOURS PRN
Qty: 12 TABLET | Refills: 0 | Status: SHIPPED | OUTPATIENT
Start: 2023-10-25

## 2023-10-26 ENCOUNTER — TELEPHONE (OUTPATIENT)
Dept: FAMILY MEDICINE CLINIC | Facility: CLINIC | Age: 33
End: 2023-10-26

## 2023-10-31 RX ORDER — ACETAMINOPHEN AND CODEINE PHOSPHATE 300; 30 MG/1; MG/1
1 TABLET ORAL EVERY 6 HOURS PRN
Qty: 12 TABLET | Refills: 0 | Status: SHIPPED | OUTPATIENT
Start: 2023-10-31

## 2023-10-31 NOTE — TELEPHONE ENCOUNTER
Last refill: 10/25/23 #12 w/ 0 refills    Last OV: 3/31/23  Last labs: Nothing ordered by us    No future appointments. Comment on the last request dated 10/25/23[de-identified]   Patient Comment: I will not be able to see my dentist til November 8th. I was wondering if this could be refilled?

## 2023-11-07 RX ORDER — ACETAMINOPHEN AND CODEINE PHOSPHATE 300; 30 MG/1; MG/1
1 TABLET ORAL EVERY 6 HOURS PRN
Qty: 12 TABLET | Refills: 0 | Status: SHIPPED | OUTPATIENT
Start: 2023-11-07

## 2023-11-07 NOTE — TELEPHONE ENCOUNTER
LOV  03/31/2023    LAST LAB  05/03/2023    LAST RX  10/31/2023    Next OV No future appointments. PROTOCOL  Refills have been requested for the following medications:         acetaminophen-codeine (TYLENOL WITH CODEINE #3) 300-30 MG Oral Tab [Luis Ellis]       Patient Comment: I see the dentist in 3 days     Preferred pharmacy: [facility] [address] [place], [place] [address]dey[facility] [address] [place] [address] ([address]), [phone], [phone]

## 2023-11-13 RX ORDER — CLONAZEPAM 0.5 MG/1
0.5 TABLET ORAL 2 TIMES DAILY PRN
Qty: 56 TABLET | Refills: 0 | Status: SHIPPED | OUTPATIENT
Start: 2023-11-13 | End: 2023-12-11

## 2023-11-13 NOTE — TELEPHONE ENCOUNTER
OV 03/31  REFILL 10/16 #56    Requested Prescriptions     Pending Prescriptions Disp Refills    clonazePAM (KLONOPIN) 0.5 MG Oral Tab 56 tablet 0     Sig: Take 1 tablet (0.5 mg total) by mouth 2 (two) times daily as needed for Anxiety. No future appointments.

## 2023-11-20 RX ORDER — DEXTROAMPHETAMINE SACCHARATE, AMPHETAMINE ASPARTATE, DEXTROAMPHETAMINE SULFATE AND AMPHETAMINE SULFATE 5; 5; 5; 5 MG/1; MG/1; MG/1; MG/1
20 TABLET ORAL 2 TIMES DAILY
Qty: 56 TABLET | Refills: 0 | Status: SHIPPED | OUTPATIENT
Start: 2023-11-20 | End: 2023-12-18

## 2023-11-20 NOTE — TELEPHONE ENCOUNTER
[facility] sent back to pt re: below
Last OV 3/31/23  Scheduled for cpx on 11/27/23    Last RF #12 on 11/7/23
Last message was that patient was going to see her dentist on 11/8 for dental pain for which she has been taking Tylenol 3.   Please get an update
Statement Selected

## 2023-11-21 RX ORDER — ACETAMINOPHEN AND CODEINE PHOSPHATE 300; 30 MG/1; MG/1
1 TABLET ORAL
Qty: 15 TABLET | Refills: 0 | Status: SHIPPED | OUTPATIENT
Start: 2023-11-21 | End: 2023-12-06

## 2023-12-02 NOTE — TELEPHONE ENCOUNTER
Last office visit: 3/31/23  Last refill: 11/21/23 qty: 15  Future Appointments   Date Time Provider Carla Thurston   12/21/2023  2:30 PM Bib Canela., DO [facility]

## 2023-12-04 RX ORDER — ACETAMINOPHEN AND CODEINE PHOSPHATE 300; 30 MG/1; MG/1
1 TABLET ORAL
Qty: 15 TABLET | Refills: 0 | OUTPATIENT
Start: 2023-12-04 | End: 2023-12-19

## 2023-12-04 NOTE — TELEPHONE ENCOUNTER
Calling patient-she was unable to get to [facility]. They only have one vehicle. Boyfriend cannot take off work, he just got a new job and cannot take off early or at all for 90 days. Pt is still having pain-RT side wisdom tooth came through and it broke-cut cheek open. Molar on bottom is cracked in half and having a lot of pain in there and into jaw. She is limited on the dentists she can see because of her insurance.

## 2023-12-05 RX ORDER — ACETAMINOPHEN AND CODEINE PHOSPHATE 300; 30 MG/1; MG/1
1 TABLET ORAL
Qty: 15 TABLET | Refills: 0 | Status: SHIPPED | OUTPATIENT
Start: 2023-12-05 | End: 2023-12-20

## 2023-12-05 NOTE — TELEPHONE ENCOUNTER
Please have patient try ibuprofen up to 800 mg 3 times daily which may be more effective than Tylenol with codeine.   I will give her a small supply of Tylenol 3

## 2023-12-22 ENCOUNTER — PATIENT MESSAGE (OUTPATIENT)
Dept: FAMILY MEDICINE CLINIC | Facility: CLINIC | Age: 33
End: 2023-12-22

## 2023-12-23 RX ORDER — DEXTROAMPHETAMINE SACCHARATE, AMPHETAMINE ASPARTATE, DEXTROAMPHETAMINE SULFATE AND AMPHETAMINE SULFATE 5; 5; 5; 5 MG/1; MG/1; MG/1; MG/1
20 TABLET ORAL 2 TIMES DAILY
Qty: 56 TABLET | Refills: 0 | Status: SHIPPED | OUTPATIENT
Start: 2023-12-23 | End: 2024-01-20

## 2023-12-23 RX ORDER — ACETAMINOPHEN AND CODEINE PHOSPHATE 300; 30 MG/1; MG/1
1 TABLET ORAL
Qty: 10 TABLET | Refills: 0 | Status: SHIPPED | OUTPATIENT
Start: 2023-12-23 | End: 2024-01-07

## 2023-12-23 NOTE — TELEPHONE ENCOUNTER
Tylenol #3: 12/5/23 #15 w/ 0 refills  Adderall: 11/20/23 #56 w/ 0 refills    Last OV: 3/31/23  Last labs: nothing routine    Future Appointments   Date Time Provider Carla Thurston   1/2/2024  4:00 PM Bib Suero, DO [facility]         [facility] sent asking pt if she has been using ibuprofen as recommended by Dr. Khadijah Iglesias the last time she requested this refill.

## 2023-12-29 DIAGNOSIS — R68.84 JAW PAIN: Primary | ICD-10-CM

## 2024-01-02 ENCOUNTER — OFFICE VISIT (OUTPATIENT)
Dept: FAMILY MEDICINE CLINIC | Facility: CLINIC | Age: 34
End: 2024-01-02
Payer: MEDICAID

## 2024-01-02 VITALS
HEART RATE: 100 BPM | OXYGEN SATURATION: 98 % | SYSTOLIC BLOOD PRESSURE: 138 MMHG | BODY MASS INDEX: 34.02 KG/M2 | DIASTOLIC BLOOD PRESSURE: 88 MMHG | WEIGHT: 192 LBS | HEIGHT: 63 IN | TEMPERATURE: 98 F

## 2024-01-02 DIAGNOSIS — F41.1 GENERALIZED ANXIETY DISORDER: ICD-10-CM

## 2024-01-02 DIAGNOSIS — Z13.1 SCREENING FOR DIABETES MELLITUS: ICD-10-CM

## 2024-01-02 DIAGNOSIS — Z13.0 SCREENING, ANEMIA, DEFICIENCY, IRON: ICD-10-CM

## 2024-01-02 DIAGNOSIS — F90.2 ATTENTION DEFICIT HYPERACTIVITY DISORDER (ADHD), COMBINED TYPE: ICD-10-CM

## 2024-01-02 DIAGNOSIS — F40.01 PANIC DISORDER WITH AGORAPHOBIA: ICD-10-CM

## 2024-01-02 DIAGNOSIS — M26.609 TMJ DYSFUNCTION: ICD-10-CM

## 2024-01-02 DIAGNOSIS — M25.572 CHRONIC PAIN OF LEFT ANKLE: ICD-10-CM

## 2024-01-02 DIAGNOSIS — G89.29 CHRONIC PAIN OF LEFT ANKLE: ICD-10-CM

## 2024-01-02 DIAGNOSIS — E66.9 OBESITY (BMI 30.0-34.9): ICD-10-CM

## 2024-01-02 DIAGNOSIS — Z13.220 SCREENING, LIPID: ICD-10-CM

## 2024-01-02 DIAGNOSIS — M76.32 ILIOTIBIAL BAND SYNDROME OF LEFT SIDE: ICD-10-CM

## 2024-01-02 DIAGNOSIS — K08.89 PAIN, DENTAL: ICD-10-CM

## 2024-01-02 DIAGNOSIS — G89.29 CHRONIC MIDLINE LOW BACK PAIN WITHOUT SCIATICA: ICD-10-CM

## 2024-01-02 DIAGNOSIS — Z00.00 PREVENTATIVE HEALTH CARE: Primary | ICD-10-CM

## 2024-01-02 DIAGNOSIS — M54.50 CHRONIC MIDLINE LOW BACK PAIN WITHOUT SCIATICA: ICD-10-CM

## 2024-01-02 DIAGNOSIS — Z13.29 SCREENING FOR THYROID DISORDER: ICD-10-CM

## 2024-01-02 LAB
BASOPHILS # BLD AUTO: 0.02 X10(3) UL (ref 0–0.2)
BASOPHILS NFR BLD AUTO: 0.2 %
EOSINOPHIL # BLD AUTO: 0.04 X10(3) UL (ref 0–0.7)
EOSINOPHIL NFR BLD AUTO: 0.4 %
ERYTHROCYTE [DISTWIDTH] IN BLOOD BY AUTOMATED COUNT: 13.2 %
HCT VFR BLD AUTO: 38.3 %
HGB BLD-MCNC: 12.8 G/DL
IMM GRANULOCYTES # BLD AUTO: 0.03 X10(3) UL (ref 0–1)
IMM GRANULOCYTES NFR BLD: 0.3 %
LYMPHOCYTES # BLD AUTO: 2.2 X10(3) UL (ref 1–4)
LYMPHOCYTES NFR BLD AUTO: 23.3 %
MCH RBC QN AUTO: 29.7 PG (ref 26–34)
MCHC RBC AUTO-ENTMCNC: 33.4 G/DL (ref 31–37)
MCV RBC AUTO: 88.9 FL
MONOCYTES # BLD AUTO: 0.36 X10(3) UL (ref 0.1–1)
MONOCYTES NFR BLD AUTO: 3.8 %
NEUTROPHILS # BLD AUTO: 6.81 X10 (3) UL (ref 1.5–7.7)
NEUTROPHILS # BLD AUTO: 6.81 X10(3) UL (ref 1.5–7.7)
NEUTROPHILS NFR BLD AUTO: 72 %
PLATELET # BLD AUTO: 396 10(3)UL (ref 150–450)
RBC # BLD AUTO: 4.31 X10(6)UL
WBC # BLD AUTO: 9.5 X10(3) UL (ref 4–11)

## 2024-01-02 PROCEDURE — 80061 LIPID PANEL: CPT | Performed by: FAMILY MEDICINE

## 2024-01-02 PROCEDURE — 83036 HEMOGLOBIN GLYCOSYLATED A1C: CPT | Performed by: FAMILY MEDICINE

## 2024-01-02 PROCEDURE — 80053 COMPREHEN METABOLIC PANEL: CPT | Performed by: FAMILY MEDICINE

## 2024-01-02 PROCEDURE — 3079F DIAST BP 80-89 MM HG: CPT | Performed by: FAMILY MEDICINE

## 2024-01-02 PROCEDURE — 99213 OFFICE O/P EST LOW 20 MIN: CPT | Performed by: FAMILY MEDICINE

## 2024-01-02 PROCEDURE — 99395 PREV VISIT EST AGE 18-39: CPT | Performed by: FAMILY MEDICINE

## 2024-01-02 PROCEDURE — 85025 COMPLETE CBC W/AUTO DIFF WBC: CPT | Performed by: FAMILY MEDICINE

## 2024-01-02 PROCEDURE — 84443 ASSAY THYROID STIM HORMONE: CPT | Performed by: FAMILY MEDICINE

## 2024-01-02 PROCEDURE — 3008F BODY MASS INDEX DOCD: CPT | Performed by: FAMILY MEDICINE

## 2024-01-02 PROCEDURE — 3075F SYST BP GE 130 - 139MM HG: CPT | Performed by: FAMILY MEDICINE

## 2024-01-02 RX ORDER — ACETAMINOPHEN AND CODEINE PHOSPHATE 300; 30 MG/1; MG/1
1 TABLET ORAL
Qty: 10 TABLET | Refills: 0 | Status: SHIPPED | OUTPATIENT
Start: 2024-01-02 | End: 2024-01-17

## 2024-01-02 NOTE — PATIENT INSTRUCTIONS
1. Preventative health care  I reviewed age-appropriate preventative health screen exams as well as immunizations.  Patient declines flu vaccine  - Lipid Panel [E]; Future  - Comp Metabolic Panel (14) [E]; Future  - TSH [E]; Future  - CBC W Differential W Platelet [E]; Future  - Hemoglobin A1C [E]; Future  - Lipid Panel [E]  - Comp Metabolic Panel (14) [E]  - TSH [E]  - CBC W Differential W Platelet [E]  - Hemoglobin A1C [E]    2. Generalized anxiety disorder  Reviewed conservative management strategies.  Continue citalopram  Discussed possible benefits of individual psychotherapy, patient declines    3. Panic disorder with agoraphobia  Reviewed conservative management strategy, see #2    4. Attention deficit hyperactivity disorder (ADHD), combined type  Discussed organizational skills, may continue Adderall, monitor clinically    5. Obesity (BMI 30.0-34.9)  Patient congratulated on her weight loss although it appears unintentional, will check for any metabolic abnormalities    6. Chronic pain of left ankle  Would recommend podiatry follow-up, patient declines due to the chronic nature    7. Pain, dental  Follow-up with his dentist as previously recommended.  I warned patient about continued use of Tylenol 3.  Discussed possible role of TMJ dysfunction and jaw pain    8. Screening, lipid  Reviewed goals for lipids  - Lipid Panel [E]; Future  - Lipid Panel [E]    9. Screening, anemia, deficiency, iron  Check labs  - CBC W Differential W Platelet [E]; Future  - CBC W Differential W Platelet [E]    10. Screening for thyroid disorder  Check labs in the setting of weight loss  - TSH [E]; Future  - TSH [E]    11. Screening for diabetes mellitus  Check A1c in the setting of weight loss  - Hemoglobin A1C [E]; Future  - Hemoglobin A1C [E]    12. Iliotibial band syndrome of left side  With strongly encouraged formal physical therapy however patient declines as she does not drive and has to watch 2 children.  I reviewed  appropriate spinal, hip, hamstring, piriformis and iliotibial band stretches    13. Chronic midline low back pain without sciatica  Reviewed spinal stretches as well as strengthening exercises.    14. TMJ dysfunction  Recommend warm compresses, gentle massage, softer diet etc.  Follow-up with dentist as previously recommended

## 2024-01-02 NOTE — H&P
HPI:   Viviane Ravi is a 33 year old female who presents for a complete physical exam.  Patient concerns:   Back and hip pain, see review of systems.   Chief Complaint   Patient presents with    Physical     Discuss back pain lower back into hips started 2 years ago. Pap with yvan       Wt Readings from Last 6 Encounters:   01/02/24 192 lb (87.1 kg)   03/31/23 225 lb (102.1 kg)   12/27/22 232 lb (105.2 kg)   12/01/17 168 lb (76.2 kg)   05/12/17 191 lb (86.6 kg)   03/13/15 165 lb (74.8 kg)     Body mass index is 34.01 kg/m².     No results found for: \"CHOLEST\", \"HDL\", \"LDL\", \"TRIGLY\", \"AST\", \"ALT\"     Current Outpatient Medications   Medication Sig Dispense Refill    acetaminophen-codeine (TYLENOL WITH CODEINE #3) 300-30 MG Oral Tab Take 1 tablet by mouth daily as needed for Pain. 10 tablet 0    amphetamine-dextroamphetamine 20 MG Oral Tab Take 1 tablet (20 mg total) by mouth 2 (two) times daily for 28 days. 56 tablet 0    citalopram 40 MG Oral Tab Take 1 tablet (40 mg total) by mouth daily. 90 tablet 3     Allergies   Allergen Reactions    Bactrim [Sulfamethoxazole W/Trimethoprim] HIVES    Penicillin G HIVES    Augmentin, [Amoxicillin-Pot Clavulanate]     Buspar [Buspirone] HIVES        Past Medical History:   Diagnosis Date    ADHD     Anxiety     Depression       Past Surgical History:   Procedure Laterality Date    OTHER SURGICAL HISTORY      LEFT ANKLE PINNING      Family History   Problem Relation Age of Onset    Other (fatty liver) Father     Other (BLAINE) Father     Hypertension Father     Lipids Father     Anxiety Mother     Anxiety Sister       Social History:   Social History     Socioeconomic History    Marital status: Single   Tobacco Use    Smoking status: Former    Smokeless tobacco: Never   Substance and Sexual Activity    Alcohol use: Yes     Alcohol/week: 1.0 - 2.0 standard drink of alcohol     Types: 1 - 2 Standard drinks or equivalent per week    Drug use: No   Gyne: Dr Coleman  Occ:  homemaker.  Children: 2, lives w/ her boyfriend and his mother.   Exercise:  4 times per week, climber .  Diet: doesn't watch  Gyne Hx: LMP:  regular, 12/29/23,  G 2, P 2002,   2 children (Kimmie,-different father, Kee)     REVIEW OF SYSTEMS:   GENERAL: generally feels well, no unusual fatigue,no excessive daytime drowsiness, good appetite, weight down 40 pounds over the past year  SKIN: denies any unusual skin lesions or rashes  EYES:denies blurred vision or double vision, glasses/ contacts: +, last eye exam :1 yr @ [facility]  HEENT: denies nasal congestion, pnd, or ST, denies snoring and reported periods of apnea, denies hearing deficits, pt going to see a dentist in [place] for right jaw pain, pt admits to bruxism  LUNGS: denies shortness of breath with exertion, no coughing or wheezing  CARDIOVASCULAR: denies chest pain on exertion, palpations, anginal equivalent symptoms, no claudication   GI: denies abdominal pain,denies heartburn, constipation, diarrhea, or change in bowel habits  : denies dysuria, vaginal discharge or itching,periods regular   MUSCULOSKELETAL: c/o midline lower back pain since last child 2 yrs ago, left hip pain into outside of left knee, chronic left ankle pain/pops  NEURO: denies headaches, tremors, dizziness, numbness, tingling, muscular weakness  PSYCHE: denies depression or anxiety, freq awakenings from 2 yr old son, freq awakenings, adderall helps her focus, pt not sure if citalopram helps, no benefit from counseling per pt  HEMATOLOGIC: denies unexplained bruising or bleeding  ENDOCRINE: denies heat or cold intolerance , no unexplained wt loss or gain  EXAM:   /88   Pulse 100   Temp 97.8 °F (36.6 °C) (Temporal)   Ht 5' 3\" (1.6 m)   Wt 192 lb (87.1 kg)   LMP 03/17/2023 (Within Days)   SpO2 98%   BMI 34.01 kg/m²   Body mass index is 34.01 kg/m².   GENERAL: well developed, well nourished,in no apparent distress  SKIN: no rashes,no suspicious lesions, multiple tattoos  both UEs, left anterior thigh, fair skin, freckles, dry skin on hands  HEENT: Marked tenderness over right greater than left TMJs, ears:  TMs intact, canals clear, hearing normal, Nose: turbinates clear,Septum midline, Pharynx: no pnd, erythema, or airway obstruction, fair dental repair, no gingival swelling or erythema  EYES:PERRLA, EOMI, Fundi: benign,conjunctiva are clear  NECK: supple,no adenopathy,no bruits, no thyromegaly or palpable nodules  LUNGS: clear to auscultation, no w/r/r  CARDIO: RRR without murmur, strong peripheral pulses, no peripheral edema  GI: good BS's,no masses, HSM or tenderness  MUSCULOSKELETAL: Mild tenderness lower lumbar interspinous spaces in the midline, flexion: fingers to ankles  EXTREMITIES: FROM of all joints tested,  no cyanosis, clubbing or edema, no varicosities  Bilateral hips: Tight piriformis bilaterally, otherwise full and symmetrical range of motion  NEURO: Oriented times three,CN 2-12 are intact,motor and sensory are grossly intact, Gait: normal, DTRs +2/4 bilaterally, Monofilament testing intact on plantar aspect of feet  PSYCH:  Speech clear and coherent, judgement: fair, appearance: neat, Affect:anxious, patient continually playing with her hair    ASSESSMENT AND PLAN:   Viviane Ravi is a 33 year old female   Encounter Diagnoses   Name Primary?    Preventative health care Yes    Generalized anxiety disorder     Panic disorder with agoraphobia     Attention deficit hyperactivity disorder (ADHD), combined type     Obesity (BMI 30.0-34.9)     Chronic pain of left ankle     Pain, dental     Screening, lipid     Screening, anemia, deficiency, iron     Screening for thyroid disorder     Screening for diabetes mellitus     Iliotibial band syndrome of left side     Chronic midline low back pain without sciatica     TMJ dysfunction      Orders Placed This Encounter   Procedures    Lipid Panel [E]    Comp Metabolic Panel (14) [E]    TSH [E]    CBC W Differential W Platelet [E]     Hemoglobin A1C [E]     Meds & Refills for this Visit:  Requested Prescriptions      No prescriptions requested or ordered in this encounter     Imaging & Consults:  None  No follow-ups on file.  Patient Instructions   1. Preventative health care  I reviewed age-appropriate preventative health screen exams as well as immunizations.  Patient declines flu vaccine  - Lipid Panel [E]; Future  - Comp Metabolic Panel (14) [E]; Future  - TSH [E]; Future  - CBC W Differential W Platelet [E]; Future  - Hemoglobin A1C [E]; Future  - Lipid Panel [E]  - Comp Metabolic Panel (14) [E]  - TSH [E]  - CBC W Differential W Platelet [E]  - Hemoglobin A1C [E]    2. Generalized anxiety disorder  Reviewed conservative management strategies.  Continue citalopram  Discussed possible benefits of individual psychotherapy, patient declines    3. Panic disorder with agoraphobia  Reviewed conservative management strategy, see #2    4. Attention deficit hyperactivity disorder (ADHD), combined type  Discussed organizational skills, may continue Adderall, monitor clinically    5. Obesity (BMI 30.0-34.9)  Patient congratulated on her weight loss although it appears unintentional, will check for any metabolic abnormalities    6. Chronic pain of left ankle  Would recommend podiatry follow-up, patient declines due to the chronic nature    7. Pain, dental  Follow-up with his dentist as previously recommended.  I warned patient about continued use of Tylenol 3.  Discussed possible role of TMJ dysfunction and jaw pain    8. Screening, lipid  Reviewed goals for lipids  - Lipid Panel [E]; Future  - Lipid Panel [E]    9. Screening, anemia, deficiency, iron  Check labs  - CBC W Differential W Platelet [E]; Future  - CBC W Differential W Platelet [E]    10. Screening for thyroid disorder  Check labs in the setting of weight loss  - TSH [E]; Future  - TSH [E]    11. Screening for diabetes mellitus  Check A1c in the setting of weight loss  - Hemoglobin A1C  [E]; Future  - Hemoglobin A1C [E]    12. Iliotibial band syndrome of left side  With strongly encouraged formal physical therapy however patient declines as she does not drive and has to watch 2 children.  I reviewed appropriate spinal, hip, hamstring, piriformis and iliotibial band stretches    13. Chronic midline low back pain without sciatica  Reviewed spinal stretches as well as strengthening exercises.    14. TMJ dysfunction  Recommend warm compresses, gentle massage, softer diet etc.  Follow-up with dentist as previously recommended    Luis Ellis DO, FAAFP

## 2024-01-03 DIAGNOSIS — Z13.1 SCREENING FOR DIABETES MELLITUS: ICD-10-CM

## 2024-01-03 DIAGNOSIS — Z13.220 SCREENING, LIPID: Primary | ICD-10-CM

## 2024-01-03 LAB
ALBUMIN SERPL-MCNC: 3.7 G/DL (ref 3.4–5)
ALBUMIN/GLOB SERPL: 0.8 {RATIO} (ref 1–2)
ALP LIVER SERPL-CCNC: 95 U/L
ALT SERPL-CCNC: 23 U/L
ANION GAP SERPL CALC-SCNC: 6 MMOL/L (ref 0–18)
AST SERPL-CCNC: 16 U/L (ref 15–37)
BILIRUB SERPL-MCNC: 0.3 MG/DL (ref 0.1–2)
BUN BLD-MCNC: 12 MG/DL (ref 9–23)
CALCIUM BLD-MCNC: 9.2 MG/DL (ref 8.5–10.1)
CHLORIDE SERPL-SCNC: 103 MMOL/L (ref 98–112)
CHOLEST SERPL-MCNC: 153 MG/DL (ref ?–200)
CO2 SERPL-SCNC: 28 MMOL/L (ref 21–32)
CREAT BLD-MCNC: 0.69 MG/DL
EGFRCR SERPLBLD CKD-EPI 2021: 117 ML/MIN/1.73M2 (ref 60–?)
EST. AVERAGE GLUCOSE BLD GHB EST-MCNC: 120 MG/DL (ref 68–126)
FASTING PATIENT LIPID ANSWER: NO
FASTING STATUS PATIENT QL REPORTED: NO
GLOBULIN PLAS-MCNC: 4.4 G/DL (ref 2.8–4.4)
GLUCOSE BLD-MCNC: 91 MG/DL (ref 70–99)
HBA1C MFR BLD: 5.8 % (ref ?–5.7)
HDLC SERPL-MCNC: 54 MG/DL (ref 40–59)
LDLC SERPL CALC-MCNC: 82 MG/DL (ref ?–100)
NONHDLC SERPL-MCNC: 99 MG/DL (ref ?–130)
OSMOLALITY SERPL CALC.SUM OF ELEC: 283 MOSM/KG (ref 275–295)
POTASSIUM SERPL-SCNC: 3.6 MMOL/L (ref 3.5–5.1)
PROT SERPL-MCNC: 8.1 G/DL (ref 6.4–8.2)
SODIUM SERPL-SCNC: 137 MMOL/L (ref 136–145)
TRIGL SERPL-MCNC: 93 MG/DL (ref 30–149)
TSI SER-ACNC: 1.23 MIU/ML (ref 0.36–3.74)
VLDLC SERPL CALC-MCNC: 15 MG/DL (ref 0–30)

## 2024-01-30 DIAGNOSIS — F41.1 GAD (GENERALIZED ANXIETY DISORDER): ICD-10-CM

## 2024-01-31 DIAGNOSIS — F41.1 GAD (GENERALIZED ANXIETY DISORDER): ICD-10-CM

## 2024-01-31 RX ORDER — CLONAZEPAM 0.5 MG/1
0.5 TABLET ORAL 2 TIMES DAILY PRN
Qty: 56 TABLET | Refills: 0 | OUTPATIENT
Start: 2024-01-31 | End: 2024-02-28

## 2024-01-31 NOTE — TELEPHONE ENCOUNTER
Last office visit: 1/2/24 (Px)   .Last filled: 1/5/24 #56 with 0 Refills   Last labs: 1/2/24     No future appointments.

## 2024-02-01 RX ORDER — CLONAZEPAM 0.5 MG/1
0.5 TABLET ORAL 2 TIMES DAILY PRN
Qty: 56 TABLET | Refills: 0 | Status: SHIPPED | OUTPATIENT
Start: 2024-02-01 | End: 2024-02-29

## 2024-02-23 DIAGNOSIS — F90.2 ATTENTION DEFICIT HYPERACTIVITY DISORDER (ADHD), COMBINED TYPE: ICD-10-CM

## 2024-02-24 DIAGNOSIS — F41.1 GAD (GENERALIZED ANXIETY DISORDER): ICD-10-CM

## 2024-02-24 NOTE — TELEPHONE ENCOUNTER
Last office visit: 1/2/24  Last refill: 2/1/24 qty 56  Labs Due: 1/1/25  No future appointments.

## 2024-02-25 RX ORDER — DEXTROAMPHETAMINE SACCHARATE, AMPHETAMINE ASPARTATE, DEXTROAMPHETAMINE SULFATE AND AMPHETAMINE SULFATE 5; 5; 5; 5 MG/1; MG/1; MG/1; MG/1
20 TABLET ORAL 2 TIMES DAILY
Qty: 56 TABLET | Refills: 0 | Status: SHIPPED | OUTPATIENT
Start: 2024-02-25 | End: 2024-03-24

## 2024-02-25 RX ORDER — CLONAZEPAM 0.5 MG/1
0.5 TABLET ORAL 2 TIMES DAILY PRN
Qty: 56 TABLET | Refills: 0 | OUTPATIENT
Start: 2024-02-25 | End: 2024-03-24

## 2024-02-27 DIAGNOSIS — F41.1 GAD (GENERALIZED ANXIETY DISORDER): ICD-10-CM

## 2024-02-27 NOTE — TELEPHONE ENCOUNTER
Last office visit: 1/2/24   Last filled:2/1/24 #56 with 0 RF  Take 1 Tablet BID   Last labs: 1/2/24     No future appointments.

## 2024-02-27 NOTE — TELEPHONE ENCOUNTER
Last office visit: 1/2/24  Last filled: 2/1/24 #56 with 0 RF  Last labs:    No future appointments.

## 2024-02-28 DIAGNOSIS — F41.1 GAD (GENERALIZED ANXIETY DISORDER): ICD-10-CM

## 2024-02-28 RX ORDER — CLONAZEPAM 0.5 MG/1
0.5 TABLET ORAL 2 TIMES DAILY PRN
Qty: 56 TABLET | Refills: 0 | OUTPATIENT
Start: 2024-02-28 | End: 2024-03-27

## 2024-02-28 RX ORDER — CLONAZEPAM 0.5 MG/1
0.5 TABLET ORAL 2 TIMES DAILY PRN
Qty: 56 TABLET | Refills: 0 | Status: SHIPPED | OUTPATIENT
Start: 2024-02-28 | End: 2024-03-27

## 2024-02-28 NOTE — TELEPHONE ENCOUNTER
Last office visit: 1/2/24  Last filled:2/1/24 #56 with 0 RF   Last labs: 1/2/24     No future appointments.    Protocol

## 2024-03-24 DIAGNOSIS — F41.1 GAD (GENERALIZED ANXIETY DISORDER): ICD-10-CM

## 2024-03-24 DIAGNOSIS — F90.2 ATTENTION DEFICIT HYPERACTIVITY DISORDER (ADHD), COMBINED TYPE: ICD-10-CM

## 2024-03-24 RX ORDER — DEXTROAMPHETAMINE SACCHARATE, AMPHETAMINE ASPARTATE, DEXTROAMPHETAMINE SULFATE AND AMPHETAMINE SULFATE 5; 5; 5; 5 MG/1; MG/1; MG/1; MG/1
20 TABLET ORAL 2 TIMES DAILY
Qty: 56 TABLET | Refills: 0 | Status: CANCELLED | OUTPATIENT
Start: 2024-03-24 | End: 2024-04-21

## 2024-03-24 RX ORDER — CLONAZEPAM 0.5 MG/1
0.5 TABLET ORAL 2 TIMES DAILY PRN
Qty: 56 TABLET | Refills: 0 | Status: CANCELLED | OUTPATIENT
Start: 2024-03-24 | End: 2024-04-21

## 2024-03-26 DIAGNOSIS — F90.2 ATTENTION DEFICIT HYPERACTIVITY DISORDER (ADHD), COMBINED TYPE: ICD-10-CM

## 2024-03-26 DIAGNOSIS — F41.1 GAD (GENERALIZED ANXIETY DISORDER): ICD-10-CM

## 2024-03-26 RX ORDER — DEXTROAMPHETAMINE SACCHARATE, AMPHETAMINE ASPARTATE, DEXTROAMPHETAMINE SULFATE AND AMPHETAMINE SULFATE 5; 5; 5; 5 MG/1; MG/1; MG/1; MG/1
20 TABLET ORAL 2 TIMES DAILY
Qty: 56 TABLET | Refills: 0 | Status: SHIPPED | OUTPATIENT
Start: 2024-03-26 | End: 2024-04-23

## 2024-03-26 RX ORDER — CLONAZEPAM 0.5 MG/1
0.5 TABLET ORAL 2 TIMES DAILY PRN
Qty: 56 TABLET | Refills: 0 | Status: SHIPPED | OUTPATIENT
Start: 2024-03-26 | End: 2024-04-23

## 2024-03-26 NOTE — TELEPHONE ENCOUNTER
amphetamine-dextroamphetamine 20 MG Oral Tab     clonazePAM (KLONOPIN) 0.5 MG Oral Tab   [facility] [place]

## 2024-04-22 DIAGNOSIS — F90.2 ATTENTION DEFICIT HYPERACTIVITY DISORDER (ADHD), COMBINED TYPE: ICD-10-CM

## 2024-04-22 DIAGNOSIS — F41.1 GAD (GENERALIZED ANXIETY DISORDER): ICD-10-CM

## 2024-04-22 NOTE — TELEPHONE ENCOUNTER
NO PROTOCOL    OV 01/02/24  REFILL  -Clonazepam 03/26 #56  -Adderall 03/26 #56    No future appointments.

## 2024-04-22 NOTE — TELEPHONE ENCOUNTER
REFILL clonazePAM (KLONOPIN) 0.5 MG Oral Tab & amphetamine-dextroamphetamine 20 MG Oral Tab TO [facility]

## 2024-04-23 RX ORDER — DEXTROAMPHETAMINE SACCHARATE, AMPHETAMINE ASPARTATE, DEXTROAMPHETAMINE SULFATE AND AMPHETAMINE SULFATE 5; 5; 5; 5 MG/1; MG/1; MG/1; MG/1
20 TABLET ORAL 2 TIMES DAILY
Qty: 56 TABLET | Refills: 0 | Status: SHIPPED | OUTPATIENT
Start: 2024-04-23 | End: 2024-05-21

## 2024-04-23 RX ORDER — CLONAZEPAM 0.5 MG/1
0.5 TABLET ORAL 2 TIMES DAILY PRN
Qty: 56 TABLET | Refills: 0 | Status: SHIPPED | OUTPATIENT
Start: 2024-04-23 | End: 2024-05-21

## 2024-05-08 ENCOUNTER — TELEPHONE (OUTPATIENT)
Dept: FAMILY MEDICINE CLINIC | Facility: CLINIC | Age: 34
End: 2024-05-08

## 2024-05-08 NOTE — TELEPHONE ENCOUNTER
P.A. done online.    Rec'd fax from [facility] stating Adderall 20mg tabs approved through 6/15/25.  Case #:  [id number]

## 2024-05-18 DIAGNOSIS — F90.2 ATTENTION DEFICIT HYPERACTIVITY DISORDER (ADHD), COMBINED TYPE: ICD-10-CM

## 2024-05-20 DIAGNOSIS — F41.1 GAD (GENERALIZED ANXIETY DISORDER): ICD-10-CM

## 2024-05-20 RX ORDER — CLONAZEPAM 0.5 MG/1
0.5 TABLET ORAL 2 TIMES DAILY PRN
Qty: 56 TABLET | Refills: 0 | Status: SHIPPED | OUTPATIENT
Start: 2024-05-20 | End: 2024-06-17

## 2024-05-20 RX ORDER — DEXTROAMPHETAMINE SACCHARATE, AMPHETAMINE ASPARTATE, DEXTROAMPHETAMINE SULFATE AND AMPHETAMINE SULFATE 5; 5; 5; 5 MG/1; MG/1; MG/1; MG/1
20 TABLET ORAL 2 TIMES DAILY
Qty: 56 TABLET | Refills: 0 | Status: SHIPPED | OUTPATIENT
Start: 2024-05-20 | End: 2024-06-17

## 2024-05-20 NOTE — TELEPHONE ENCOUNTER
Last office visit:1/2/24 (px)   Last filled:04/23/224  Last labs:01/02/24    No future appointments.    Protocol:  Controlled Substance Medication Ujcawe0105/20/2024 01:31 PM    This medication is a controlled substance - forward to provider to refill

## 2024-05-20 NOTE — TELEPHONE ENCOUNTER
Last office visit: 1/2/24 (px)   Last filled:4/23/24 #56 with 0 RF   Last labs:01/02/24     No future appointments.    Protocol:    Controlled Substance Medication Kyrkfz2605/20/2024 11:59 AM    This medication is a controlled substance - forward to provider to refill

## 2024-06-15 DIAGNOSIS — F41.1 GAD (GENERALIZED ANXIETY DISORDER): ICD-10-CM

## 2024-06-15 DIAGNOSIS — F90.2 ATTENTION DEFICIT HYPERACTIVITY DISORDER (ADHD), COMBINED TYPE: ICD-10-CM

## 2024-06-17 DIAGNOSIS — R68.84 JAW PAIN: ICD-10-CM

## 2024-06-17 RX ORDER — CLONAZEPAM 0.5 MG/1
0.5 TABLET ORAL 2 TIMES DAILY PRN
Qty: 56 TABLET | Refills: 0 | Status: SHIPPED | OUTPATIENT
Start: 2024-06-17 | End: 2024-07-15

## 2024-06-17 RX ORDER — DEXTROAMPHETAMINE SACCHARATE, AMPHETAMINE ASPARTATE, DEXTROAMPHETAMINE SULFATE AND AMPHETAMINE SULFATE 5; 5; 5; 5 MG/1; MG/1; MG/1; MG/1
20 TABLET ORAL 2 TIMES DAILY
Qty: 56 TABLET | Refills: 0 | Status: SHIPPED | OUTPATIENT
Start: 2024-06-17 | End: 2024-07-15

## 2024-06-17 RX ORDER — ACETAMINOPHEN AND CODEINE PHOSPHATE 300; 30 MG/1; MG/1
1 TABLET ORAL
Qty: 10 TABLET | Refills: 0 | OUTPATIENT
Start: 2024-06-17 | End: 2024-07-02

## 2024-06-17 NOTE — TELEPHONE ENCOUNTER
Last office visit:1/2/24 (px)  Last filled:Adderall: 5/20/24 #456 with 0 RF   Clonazepam: 5/20/24 #56 with 0 RF     Last labs: 1/2/24     No future appointments.    Protocol:Adderall and Clonazepam     Controlled Substance Medication Zwemkc04/15/2024 01:27 PM    This medication is a controlled substance - forward to provider to refill

## 2024-06-17 NOTE — TELEPHONE ENCOUNTER
Spoke with pt. States she had requested this by mistake. States she called [facility] back and told them to cancel the request, but it must have sent to us anyway.    Please disregard and close note

## 2024-06-17 NOTE — TELEPHONE ENCOUNTER
Last office visit:1/2/24   Last filled:1/2/24 #10 with 0 RF   Last labs:1/2/24     No future appointments.    Protocol:   Controlled substance NO PROTOCOL

## 2024-07-12 DIAGNOSIS — F41.1 GAD (GENERALIZED ANXIETY DISORDER): ICD-10-CM

## 2024-07-12 DIAGNOSIS — F90.2 ATTENTION DEFICIT HYPERACTIVITY DISORDER (ADHD), COMBINED TYPE: ICD-10-CM

## 2024-07-12 NOTE — TELEPHONE ENCOUNTER
Last office visit:24 (px)  Last filled: Adderall 20m24 #56 with 0 RF  Clonazepam: 24 #56 with 0 RF   Last labs:24     No future appointments.    Protocol:  Controlled Substance Medication [id number] 10:43 AM    This medication is a controlled substance - forward to provider to refill

## 2024-07-14 RX ORDER — DEXTROAMPHETAMINE SACCHARATE, AMPHETAMINE ASPARTATE, DEXTROAMPHETAMINE SULFATE AND AMPHETAMINE SULFATE 5; 5; 5; 5 MG/1; MG/1; MG/1; MG/1
20 TABLET ORAL 2 TIMES DAILY
Qty: 56 TABLET | Refills: 0 | Status: SHIPPED | OUTPATIENT
Start: 2024-07-14 | End: 2024-08-11

## 2024-07-14 RX ORDER — CLONAZEPAM 0.5 MG/1
0.5 TABLET ORAL 2 TIMES DAILY PRN
Qty: 56 TABLET | Refills: 0 | Status: SHIPPED | OUTPATIENT
Start: 2024-07-14 | End: 2024-08-11

## 2024-08-08 DIAGNOSIS — F90.2 ATTENTION DEFICIT HYPERACTIVITY DISORDER (ADHD), COMBINED TYPE: ICD-10-CM

## 2024-08-08 DIAGNOSIS — F41.1 GAD (GENERALIZED ANXIETY DISORDER): ICD-10-CM

## 2024-08-09 RX ORDER — CLONAZEPAM 0.5 MG/1
0.5 TABLET ORAL 2 TIMES DAILY PRN
Qty: 56 TABLET | Refills: 0 | Status: SHIPPED | OUTPATIENT
Start: 2024-08-09 | End: 2024-09-06

## 2024-08-09 RX ORDER — DEXTROAMPHETAMINE SACCHARATE, AMPHETAMINE ASPARTATE, DEXTROAMPHETAMINE SULFATE AND AMPHETAMINE SULFATE 5; 5; 5; 5 MG/1; MG/1; MG/1; MG/1
20 TABLET ORAL 2 TIMES DAILY
Qty: 56 TABLET | Refills: 0 | Status: SHIPPED | OUTPATIENT
Start: 2024-08-09 | End: 2024-09-06

## 2024-09-03 DIAGNOSIS — F41.1 GAD (GENERALIZED ANXIETY DISORDER): ICD-10-CM

## 2024-09-03 DIAGNOSIS — F90.2 ATTENTION DEFICIT HYPERACTIVITY DISORDER (ADHD), COMBINED TYPE: ICD-10-CM

## 2024-09-03 NOTE — TELEPHONE ENCOUNTER
LOV:1/2/24  LAST LAB:1/2/2024  LAST RX:  clonazePAM (KLONOPIN) 0.5 MG Oral Tab 56 tablet 0 8/9/2024 9/6/2024   Sig:   Take 1 tablet (0.5 mg total) by mouth 2 (two) times daily as needed for Anxiety       amphetamine-dextroamphetamine 20 MG Oral Tab 56 tablet 0 8/9/2024 9/6/2024   Sig:   Take 1 tablet (20 mg total) by mouth 2 (two) times daily for 28 days.       Next OV: No future appointments.     PROTOCOL:

## 2024-09-04 DIAGNOSIS — F41.1 GAD (GENERALIZED ANXIETY DISORDER): ICD-10-CM

## 2024-09-05 RX ORDER — CLONAZEPAM 0.5 MG/1
0.5 TABLET ORAL 2 TIMES DAILY PRN
Qty: 56 TABLET | Refills: 0 | Status: SHIPPED | OUTPATIENT
Start: 2024-09-05

## 2024-09-05 RX ORDER — DEXTROAMPHETAMINE SACCHARATE, AMPHETAMINE ASPARTATE, DEXTROAMPHETAMINE SULFATE AND AMPHETAMINE SULFATE 5; 5; 5; 5 MG/1; MG/1; MG/1; MG/1
20 TABLET ORAL 2 TIMES DAILY
Qty: 56 TABLET | Refills: 0 | Status: SHIPPED | OUTPATIENT
Start: 2024-09-05 | End: 2024-09-29

## 2024-09-05 RX ORDER — CLONAZEPAM 0.5 MG/1
0.5 TABLET ORAL 2 TIMES DAILY PRN
Qty: 56 TABLET | Refills: 0 | OUTPATIENT
Start: 2024-09-05 | End: 2024-10-03

## 2024-09-29 DIAGNOSIS — F90.2 ATTENTION DEFICIT HYPERACTIVITY DISORDER (ADHD), COMBINED TYPE: ICD-10-CM

## 2024-09-29 DIAGNOSIS — F41.1 GAD (GENERALIZED ANXIETY DISORDER): ICD-10-CM

## 2024-09-30 NOTE — TELEPHONE ENCOUNTER
Requested Prescriptions     Pending Prescriptions Disp Refills    clonazePAM 0.5 MG Oral Tab 56 tablet 0     Sig: Take 1 tablet (0.5 mg total) by mouth 2 (two) times daily as needed.     Last refill 9/5/24 #56  LOV 1/2/24  No future appointments.

## 2024-10-02 RX ORDER — CLONAZEPAM 0.5 MG/1
0.5 TABLET ORAL 2 TIMES DAILY PRN
Qty: 56 TABLET | Refills: 0 | Status: SHIPPED | OUTPATIENT
Start: 2024-10-02

## 2024-10-02 RX ORDER — DEXTROAMPHETAMINE SACCHARATE, AMPHETAMINE ASPARTATE, DEXTROAMPHETAMINE SULFATE AND AMPHETAMINE SULFATE 5; 5; 5; 5 MG/1; MG/1; MG/1; MG/1
20 TABLET ORAL 2 TIMES DAILY
Qty: 56 TABLET | Refills: 0 | Status: SHIPPED | OUTPATIENT
Start: 2024-10-02 | End: 2024-10-30

## 2024-10-26 DIAGNOSIS — F90.2 ATTENTION DEFICIT HYPERACTIVITY DISORDER (ADHD), COMBINED TYPE: ICD-10-CM

## 2024-10-26 DIAGNOSIS — F41.1 GAD (GENERALIZED ANXIETY DISORDER): ICD-10-CM

## 2024-10-28 RX ORDER — CITALOPRAM HYDROBROMIDE 40 MG/1
40 TABLET ORAL DAILY
Qty: 90 TABLET | Refills: 3 | Status: SHIPPED | OUTPATIENT
Start: 2024-10-28

## 2024-10-28 RX ORDER — CLONAZEPAM 0.5 MG/1
0.5 TABLET ORAL 2 TIMES DAILY PRN
Qty: 56 TABLET | Refills: 0 | Status: SHIPPED | OUTPATIENT
Start: 2024-10-28

## 2024-10-28 RX ORDER — DEXTROAMPHETAMINE SACCHARATE, AMPHETAMINE ASPARTATE, DEXTROAMPHETAMINE SULFATE AND AMPHETAMINE SULFATE 5; 5; 5; 5 MG/1; MG/1; MG/1; MG/1
20 TABLET ORAL 2 TIMES DAILY
Qty: 56 TABLET | Refills: 0 | Status: SHIPPED | OUTPATIENT
Start: 2024-10-28 | End: 2024-11-25

## 2024-10-28 NOTE — TELEPHONE ENCOUNTER
Last office visit:1/2/24   Last filled:Citalopram:1/26/24 #90 with 3 RF   Clonazepam:10/02/24 #56 with 0 RF   Amphetamine-dextroamphetamine 20mg: 10/02/24 #56 with 0 RF     Last labs:1/2/24     Future Appointments   Date Time Provider Department Center   10/30/2024  3:45 PM Luis Ellis, DO [facility]     Protocol:  Psychiatric Non-Scheduled (Anti-Anxiety) Qvivhw82/26/2024 06:07 PM   Protocol Details In person appointment or virtual visit in the past 6 mos or appointment in next 3 mos    Depression Screening completed within the past 12 months       Controlled Substance Medication Ivftow96/26/2024 06:07 PM    This medication is a controlled substance - forward to provider to refill

## 2024-10-30 ENCOUNTER — OFFICE VISIT (OUTPATIENT)
Dept: FAMILY MEDICINE CLINIC | Facility: CLINIC | Age: 34
End: 2024-10-30
Payer: MEDICAID

## 2024-10-30 VITALS
WEIGHT: 174 LBS | SYSTOLIC BLOOD PRESSURE: 128 MMHG | BODY MASS INDEX: 30.83 KG/M2 | RESPIRATION RATE: 18 BRPM | HEART RATE: 92 BPM | TEMPERATURE: 98 F | OXYGEN SATURATION: 98 % | DIASTOLIC BLOOD PRESSURE: 88 MMHG | HEIGHT: 63 IN

## 2024-10-30 DIAGNOSIS — E66.811 OBESITY (BMI 30.0-34.9): ICD-10-CM

## 2024-10-30 DIAGNOSIS — F90.2 ATTENTION DEFICIT HYPERACTIVITY DISORDER (ADHD), COMBINED TYPE: ICD-10-CM

## 2024-10-30 DIAGNOSIS — F41.1 GAD (GENERALIZED ANXIETY DISORDER): ICD-10-CM

## 2024-10-30 DIAGNOSIS — R10.11 RIGHT UPPER QUADRANT ABDOMINAL PAIN: Primary | ICD-10-CM

## 2024-10-30 PROCEDURE — 99214 OFFICE O/P EST MOD 30 MIN: CPT | Performed by: FAMILY MEDICINE

## 2024-10-30 NOTE — PROGRESS NOTES
Viviane Ravi is a 33 year old female.  Chief Complaint   Patient presents with    ADHD     Follow up     Anxiety     F/u for medication refill        HPI:   Patient complains of abdominal discomfort at a general feeling of illness since eating at [employer] approximately 1-1/2 weeks ago.  See review of systems  Current Outpatient Medications   Medication Sig Dispense Refill    citalopram 40 MG Oral Tab Take 1 tablet (40 mg total) by mouth daily. 90 tablet 3    clonazePAM 0.5 MG Oral Tab Take 1 tablet (0.5 mg total) by mouth 2 (two) times daily as needed. 56 tablet 0    amphetamine-dextroamphetamine 20 MG Oral Tab Take 1 tablet (20 mg total) by mouth 2 (two) times daily for 28 days. 56 tablet 0      Past Medical History:    ADHD    Anxiety    Depression      Social History:  Social History     Socioeconomic History    Marital status: Single   Tobacco Use    Smoking status: Former    Smokeless tobacco: Never   Substance and Sexual Activity    Alcohol use: Not Currently     Alcohol/week: 1.0 - 2.0 standard drink of alcohol     Types: 1 - 2 Standard drinks or equivalent per week     Comment: previously much heavier 3-4 yrs ago    Drug use: Yes     Types: Cannabis   Other Topics Concern    Caffeine Concern Yes     Comment: 1 pot of coffee and tea     Social Drivers of Health      [facility]    Social Connections    Received from [facility], [facility]    Housing Stability        REVIEW OF SYSTEMS:   GENERAL: generally feels well, no unusual fatigue,no excessive daytime drowsiness, good appetite, weight down 18 pounds since last visit, more active, eating healthier  SKIN: denies any unusual skin lesions or rashes  EYES:denies blurred vision or double vision, glasses/ contacts: +, last eye exam :1 yr @ [facility]  HEENT: denies nasal congestion, pnd, or ST, denies snoring and reported periods of apnea, denies hearing  deficits  LUNGS: denies shortness of breath with exertion, no coughing or wheezing  CARDIOVASCULAR: denies chest pain on exertion, palpations, anginal equivalent symptoms, no claudication   GI: denies heartburn, constipation, diarrhea, or change in bowel habits, patient states she has not felt well since eating at [employer] 1-1/2 weeks ago.  Her appetite has been down, she is had right upper quadrant abdominal discomfort, no change in bowel movements  : denies dysuria, vaginal discharge or itching,periods regular   NEURO: denies headaches, tremors, dizziness, numbness, tingling, muscular weakness  PSYCHE: denies depression or anxiety, freq awakenings from ~3 yr old son, freq awakenings, adderall helps her focus, no benefit from counseling per pt, klonopin helps prevent \"paralysis by analysis\", without the clonazepam she will overthink everything and not decide anything.  It takes her forever to make a decision without clonazepam  HEMATOLOGIC: denies unexplained bruising or bleeding  ENDOCRINE: denies heat or cold intolerance , no unexplained wt loss or gain  EXAM:   /88 (BP Location: Left arm, Patient Position: Sitting, Cuff Size: adult)   Pulse 92   Temp 98.2 °F (36.8 °C) (Temporal)   Resp 18   Ht 5' 3\" (1.6 m)   Wt 174 lb (78.9 kg)   LMP 10/23/2024   SpO2 98%   BMI 30.82 kg/m²   GENERAL: well developed, well nourished,in no apparent distress  SKIN: no rashes,no suspicious lesions, multiple tattoos both UEs, left anterior thigh, fair skin, freckles, dry skin on hands  HEENT: Marked tenderness over right greater than left TMJs, ears:  TMs intact, canals clear, hearing normal, Nose: turbinates clear,Septum midline, Pharynx: no pnd, erythema, or airway obstruction, fair dental repair, no gingival swelling or erythema  EYES:PERRLA, EOMI, Fundi: benign,conjunctiva are clear  NECK: supple,no adenopathy,no bruits, no thyromegaly or palpable nodules  LUNGS: clear to auscultation, no w/r/r  CARDIO: RRR without  murmur, strong peripheral pulses, no peripheral edema  GI: good BS's,no masses, HSM , + upper quadrant tenderness to firm palpation, no rebound, rigidity or guarding, positive Ureña sign  EXTREMITIES: FROM of all joints tested,  no cyanosis, clubbing or edema, no varicosities  NEURO: Oriented times three,CN 2-12 are intact,motor and sensory are grossly intact, Gait: normal, DTRs +2/4 bilaterally, Monofilament testing intact on plantar aspect of feet  PSYCH:  Speech clear and coherent, judgement: fair, appearance: neat, Affect:anxious, patient continually playing with her hair  ASSESSMENT AND PLAN:     Encounter Diagnoses   Name Primary?    Right upper quadrant abdominal pain Yes    MARIO (generalized anxiety disorder)     Attention deficit hyperactivity disorder (ADHD), combined type     Obesity (BMI 30.0-34.9)      No orders of the defined types were placed in this encounter.    Meds & Refills for this Visit:  Requested Prescriptions      No prescriptions requested or ordered in this encounter     Imaging & Consults:  US ABDOMEN LIMITED (CPT=76705)  No follow-ups on file.  Patient Instructions   I discussed possible contributing factors for her abdominal pain.  Suspect biliary disease.  Patient to schedule biliary ultrasound at her very earliest convenience.  Discussed importance of dietary modification including avoidance of fatty or greasy foods  Discussed signs and symptoms of progressive disease requiring urgent evaluation  Discussed current medication regimen.  Continue Adderall and clonazepam as ordered.  Continue citalopram  Discussed age-appropriate immunizations.  Strongly encouraged Tdap booster, patient wishes to defer until a later appointment  Discussed current guidelines and recommendations for cervical cancer screening.  Follow-up with gynecologist  The patient indicates understanding of these issues and agrees to the plan.    Luis Ellis DO, FAAFP

## 2024-10-30 NOTE — PATIENT INSTRUCTIONS
I discussed possible contributing factors for her abdominal pain.  Suspect biliary disease.  Patient to schedule biliary ultrasound at her very earliest convenience.  Discussed importance of dietary modification including avoidance of fatty or greasy foods  Discussed signs and symptoms of progressive disease requiring urgent evaluation  Discussed current medication regimen.  Continue Adderall and clonazepam as ordered.  Continue citalopram  Discussed age-appropriate immunizations.  Strongly encouraged Tdap booster, patient wishes to defer until a later appointment  Discussed current guidelines and recommendations for cervical cancer screening.  Follow-up with gynecologist

## 2024-11-23 DIAGNOSIS — F41.1 GAD (GENERALIZED ANXIETY DISORDER): ICD-10-CM

## 2024-11-23 DIAGNOSIS — F90.2 ATTENTION DEFICIT HYPERACTIVITY DISORDER (ADHD), COMBINED TYPE: ICD-10-CM

## 2024-11-25 RX ORDER — DEXTROAMPHETAMINE SACCHARATE, AMPHETAMINE ASPARTATE, DEXTROAMPHETAMINE SULFATE AND AMPHETAMINE SULFATE 5; 5; 5; 5 MG/1; MG/1; MG/1; MG/1
20 TABLET ORAL 2 TIMES DAILY
Qty: 56 TABLET | Refills: 0 | Status: SHIPPED | OUTPATIENT
Start: 2024-11-25 | End: 2024-12-23

## 2024-11-25 RX ORDER — CLONAZEPAM 0.5 MG/1
0.5 TABLET ORAL 2 TIMES DAILY PRN
Qty: 56 TABLET | Refills: 0 | Status: SHIPPED | OUTPATIENT
Start: 2024-11-25

## 2024-11-25 NOTE — TELEPHONE ENCOUNTER
Last office visit:10/30/24   Last filled:Clonazepam:10/28/24 #56 with 0 RF  Mphetamine-Dextroamphetamine: #56 with 0 RF  Last labs: 1/2/24     No future appointments.    Protocol:  Controlled Substance Medication [id number]/23/2024 04:37 PM    This medication is a controlled substance - forward to provider to refill       Controlled Substance Medication Goxlpi5511/23/2024 04:37 PM    This medication is a controlled substance - forward to provider to refill

## 2024-12-20 DIAGNOSIS — F90.2 ATTENTION DEFICIT HYPERACTIVITY DISORDER (ADHD), COMBINED TYPE: ICD-10-CM

## 2024-12-20 DIAGNOSIS — F41.1 GAD (GENERALIZED ANXIETY DISORDER): ICD-10-CM

## 2024-12-21 NOTE — TELEPHONE ENCOUNTER
No protocol    OV 10/30/24  REFILL  -Clonazepam 0.5 mg 11/25/24 #56  -Adderall 20 mg 11/25/24 #56    No future appointments.

## 2024-12-22 RX ORDER — CLONAZEPAM 0.5 MG/1
0.5 TABLET ORAL 2 TIMES DAILY PRN
Qty: 56 TABLET | Refills: 0 | Status: SHIPPED | OUTPATIENT
Start: 2024-12-22

## 2024-12-22 RX ORDER — DEXTROAMPHETAMINE SACCHARATE, AMPHETAMINE ASPARTATE, DEXTROAMPHETAMINE SULFATE AND AMPHETAMINE SULFATE 5; 5; 5; 5 MG/1; MG/1; MG/1; MG/1
20 TABLET ORAL 2 TIMES DAILY
Qty: 56 TABLET | Refills: 0 | Status: SHIPPED | OUTPATIENT
Start: 2024-12-22 | End: 2025-01-19

## 2025-01-18 DIAGNOSIS — F41.1 GAD (GENERALIZED ANXIETY DISORDER): ICD-10-CM

## 2025-01-18 DIAGNOSIS — F90.2 ATTENTION DEFICIT HYPERACTIVITY DISORDER (ADHD), COMBINED TYPE: ICD-10-CM

## 2025-01-20 RX ORDER — DEXTROAMPHETAMINE SACCHARATE, AMPHETAMINE ASPARTATE, DEXTROAMPHETAMINE SULFATE AND AMPHETAMINE SULFATE 5; 5; 5; 5 MG/1; MG/1; MG/1; MG/1
20 TABLET ORAL 2 TIMES DAILY
Qty: 56 TABLET | Refills: 0 | Status: SHIPPED | OUTPATIENT
Start: 2025-01-20 | End: 2025-02-17

## 2025-01-20 RX ORDER — CLONAZEPAM 0.5 MG/1
0.5 TABLET ORAL 2 TIMES DAILY PRN
Qty: 56 TABLET | Refills: 0 | Status: SHIPPED | OUTPATIENT
Start: 2025-01-20

## 2025-01-20 NOTE — TELEPHONE ENCOUNTER
No protocol    OV 10/30/24  REFILL  -Clonazepam 0.5 mg 12/22/24 #56  -Adderall 20 mg 12/22/24 #56    No future appointments.

## 2025-02-15 DIAGNOSIS — F41.1 GAD (GENERALIZED ANXIETY DISORDER): ICD-10-CM

## 2025-02-15 DIAGNOSIS — F90.2 ATTENTION DEFICIT HYPERACTIVITY DISORDER (ADHD), COMBINED TYPE: ICD-10-CM

## 2025-02-17 ENCOUNTER — PATIENT MESSAGE (OUTPATIENT)
Dept: FAMILY MEDICINE CLINIC | Facility: CLINIC | Age: 35
End: 2025-02-17

## 2025-02-17 ENCOUNTER — TELEPHONE (OUTPATIENT)
Dept: FAMILY MEDICINE CLINIC | Facility: CLINIC | Age: 35
End: 2025-02-17

## 2025-02-18 RX ORDER — DEXTROAMPHETAMINE SACCHARATE, AMPHETAMINE ASPARTATE, DEXTROAMPHETAMINE SULFATE AND AMPHETAMINE SULFATE 5; 5; 5; 5 MG/1; MG/1; MG/1; MG/1
20 TABLET ORAL 2 TIMES DAILY
Qty: 56 TABLET | Refills: 0 | Status: SHIPPED | OUTPATIENT
Start: 2025-02-18 | End: 2025-03-18

## 2025-02-18 RX ORDER — CLONAZEPAM 0.5 MG/1
0.5 TABLET ORAL 2 TIMES DAILY PRN
Qty: 56 TABLET | Refills: 0 | Status: SHIPPED | OUTPATIENT
Start: 2025-02-18

## 2025-02-18 NOTE — TELEPHONE ENCOUNTER
No protocol    OV 10/30/24  REFILL  -Clonazepam 0.5 mg 01/20/25 #56  -Adderall 20 mg 01/20/25 #56    No future appointments.

## 2025-03-17 DIAGNOSIS — F41.1 GAD (GENERALIZED ANXIETY DISORDER): ICD-10-CM

## 2025-03-17 DIAGNOSIS — F90.2 ATTENTION DEFICIT HYPERACTIVITY DISORDER (ADHD), COMBINED TYPE: ICD-10-CM

## 2025-03-17 NOTE — TELEPHONE ENCOUNTER
No protocol    OV 10/30/24  REFILL  -Clonazepam 05 mg 02/18/25 #56  -Adderall 20 mg 02/18/25 #56    No future appointments.

## 2025-03-18 RX ORDER — CLONAZEPAM 0.5 MG/1
0.5 TABLET ORAL 2 TIMES DAILY PRN
Qty: 56 TABLET | Refills: 0 | Status: SHIPPED | OUTPATIENT
Start: 2025-03-18

## 2025-03-18 RX ORDER — DEXTROAMPHETAMINE SACCHARATE, AMPHETAMINE ASPARTATE, DEXTROAMPHETAMINE SULFATE AND AMPHETAMINE SULFATE 5; 5; 5; 5 MG/1; MG/1; MG/1; MG/1
20 TABLET ORAL 2 TIMES DAILY
Qty: 56 TABLET | Refills: 0 | Status: SHIPPED | OUTPATIENT
Start: 2025-03-18 | End: 2025-04-15

## 2025-03-18 NOTE — TELEPHONE ENCOUNTER
Called Viviane and spoke with her and set up a physical, pap and breast exam  Future Appointments   Date Time Provider Department Center   3/24/2025  3:00 PM Verna Mercer APRN [facility] [place]

## 2025-04-14 DIAGNOSIS — F41.1 GAD (GENERALIZED ANXIETY DISORDER): ICD-10-CM

## 2025-04-14 DIAGNOSIS — F90.2 ATTENTION DEFICIT HYPERACTIVITY DISORDER (ADHD), COMBINED TYPE: ICD-10-CM

## 2025-04-14 NOTE — TELEPHONE ENCOUNTER
amphetamine-dextroamphetamine 20 MG Oral Tab  [facility]  Patient will call back to schedule physical.

## 2025-04-14 NOTE — TELEPHONE ENCOUNTER
No protocol    OV 10/30/24  REFILL 03/18/25 #56    No future appointments. [facility] reminder for px sent to susie

## 2025-04-15 RX ORDER — CLONAZEPAM 0.5 MG/1
0.5 TABLET ORAL 2 TIMES DAILY PRN
Qty: 56 TABLET | Refills: 0 | Status: SHIPPED | OUTPATIENT
Start: 2025-04-15

## 2025-04-15 RX ORDER — DEXTROAMPHETAMINE SACCHARATE, AMPHETAMINE ASPARTATE, DEXTROAMPHETAMINE SULFATE AND AMPHETAMINE SULFATE 5; 5; 5; 5 MG/1; MG/1; MG/1; MG/1
20 TABLET ORAL 2 TIMES DAILY
Qty: 56 TABLET | Refills: 0 | Status: SHIPPED | OUTPATIENT
Start: 2025-04-15 | End: 2025-05-13

## 2025-05-07 ENCOUNTER — TELEPHONE (OUTPATIENT)
Dept: FAMILY MEDICINE CLINIC | Facility: CLINIC | Age: 35
End: 2025-05-07

## 2025-05-07 NOTE — TELEPHONE ENCOUNTER
Received a fax from [employer] stating that a prior auth is about to . Answers completed in epic. Awaiting authorization determination.

## 2025-05-11 DIAGNOSIS — F41.1 GAD (GENERALIZED ANXIETY DISORDER): ICD-10-CM

## 2025-05-11 DIAGNOSIS — F90.2 ATTENTION DEFICIT HYPERACTIVITY DISORDER (ADHD), COMBINED TYPE: ICD-10-CM

## 2025-05-12 RX ORDER — CLONAZEPAM 0.5 MG/1
0.5 TABLET ORAL 2 TIMES DAILY PRN
Qty: 56 TABLET | Refills: 0 | Status: SHIPPED | OUTPATIENT
Start: 2025-05-12

## 2025-05-12 RX ORDER — DEXTROAMPHETAMINE SACCHARATE, AMPHETAMINE ASPARTATE, DEXTROAMPHETAMINE SULFATE AND AMPHETAMINE SULFATE 5; 5; 5; 5 MG/1; MG/1; MG/1; MG/1
20 TABLET ORAL 2 TIMES DAILY
Qty: 56 TABLET | Refills: 0 | Status: SHIPPED | OUTPATIENT
Start: 2025-05-12 | End: 2025-06-09

## 2025-05-12 NOTE — TELEPHONE ENCOUNTER
Clonazepam   Last refill: 04/15/25  Qty 56 tabs  W/ 0 refills  Last ov 10/30/24    Amphetamine   Last refill: 04/15/25  Qty 56 tabs  W/ 0 refills  Last ov 10/30/24    Requested Prescriptions     Pending Prescriptions Disp Refills    clonazePAM 0.5 MG Oral Tab 56 tablet 0     Sig: Take 1 tablet (0.5 mg total) by mouth 2 (two) times daily as needed.    amphetamine-dextroamphetamine 20 MG Oral Tab 56 tablet 0     Sig: Take 1 tablet (20 mg total) by mouth 2 (two) times daily for 28 days.     No future appointments.

## 2025-05-12 NOTE — TELEPHONE ENCOUNTER
Electronically submitted to pharmacy     Mucinex for another 5-7 days  Start Claritin ( or other antihistamine) daily for 2 weeks  Healthy diet, stay hydrated  Call if worse will get labs and xray.

## 2025-06-07 DIAGNOSIS — F90.2 ATTENTION DEFICIT HYPERACTIVITY DISORDER (ADHD), COMBINED TYPE: ICD-10-CM

## 2025-06-07 DIAGNOSIS — F41.1 GAD (GENERALIZED ANXIETY DISORDER): ICD-10-CM

## 2025-06-09 ENCOUNTER — PATIENT MESSAGE (OUTPATIENT)
Dept: FAMILY MEDICINE CLINIC | Facility: CLINIC | Age: 35
End: 2025-06-09

## 2025-06-09 RX ORDER — CLONAZEPAM 0.5 MG/1
0.5 TABLET ORAL 2 TIMES DAILY PRN
Qty: 56 TABLET | Refills: 0 | Status: SHIPPED | OUTPATIENT
Start: 2025-06-09

## 2025-06-09 RX ORDER — DEXTROAMPHETAMINE SACCHARATE, AMPHETAMINE ASPARTATE, DEXTROAMPHETAMINE SULFATE AND AMPHETAMINE SULFATE 5; 5; 5; 5 MG/1; MG/1; MG/1; MG/1
20 TABLET ORAL 2 TIMES DAILY
Qty: 56 TABLET | Refills: 0 | Status: SHIPPED | OUTPATIENT
Start: 2025-06-09 | End: 2025-07-07

## 2025-06-09 NOTE — TELEPHONE ENCOUNTER
Viviane called back to set up a physical, pap and breast exam.  Future Appointments   Date Time Provider Department Center   6/16/2025  3:20 PM Verna Mercer APRN [facility]

## 2025-06-09 NOTE — TELEPHONE ENCOUNTER
Last office visit:  px: 1/2/24  No future appointments.    clonazePAM 0.5 MG Oral Tab     Controlled Substance Medication [id number]/07/2025 01:40 PM    This medication is a controlled substance - forward to provider to refill    Medication is active on med list      Last filled: 5/12/25 #56 tab  Last labs: 1/2/24 lipid, CMP, TSH, CBC, A1C  Last BP:   BP Readings from Last 3 Encounters:   10/30/24 128/88   01/02/24 138/88   03/31/23 120/80       amphetamine-dextroamphetamine 20 MG Oral Tab     Controlled Substance Medication Nmxcwd1006/07/2025 01:40 PM    This medication is a controlled substance - forward to provider to refill    Medication is active on med list      Last filled: 5/12/25 #56 tab  Last labs: 1/2/24 lipid, CMP, TSH, CBC, A1C  Last BP:    BP Readings from Last 3 Encounters:   10/30/24 128/88   01/02/24 138/88   03/31/23 120/80     Called Viviane and left a message for her to call the office, due for a physical and labs, also sent a [employer] message.

## 2025-07-04 DIAGNOSIS — F90.2 ATTENTION DEFICIT HYPERACTIVITY DISORDER (ADHD), COMBINED TYPE: ICD-10-CM

## 2025-07-04 DIAGNOSIS — F41.1 GAD (GENERALIZED ANXIETY DISORDER): ICD-10-CM

## 2025-07-05 NOTE — TELEPHONE ENCOUNTER
Clonazapam   Last refill: 06/09/25  Qty 56 tabs  W/ 0 refills  Last ov: 10/30/24    Adderall  20 mg  Last refill: 06/09/25  Qty 56 tabs  W/ 0 refills  Last ov 10/30/24    Requested Prescriptions     Pending Prescriptions Disp Refills    clonazePAM 0.5 MG Oral Tab 56 tablet 0     Sig: Take 1 tablet (0.5 mg total) by mouth 2 (two) times daily as needed.    amphetamine-dextroamphetamine 20 MG Oral Tab 56 tablet 0     Sig: Take 1 tablet (20 mg total) by mouth 2 (two) times daily for 28 days.     No future appointments.

## 2025-07-07 RX ORDER — CLONAZEPAM 0.5 MG/1
0.5 TABLET ORAL 2 TIMES DAILY PRN
Qty: 56 TABLET | Refills: 0 | Status: SHIPPED | OUTPATIENT
Start: 2025-07-07

## 2025-07-07 RX ORDER — DEXTROAMPHETAMINE SACCHARATE, AMPHETAMINE ASPARTATE, DEXTROAMPHETAMINE SULFATE AND AMPHETAMINE SULFATE 5; 5; 5; 5 MG/1; MG/1; MG/1; MG/1
20 TABLET ORAL 2 TIMES DAILY
Qty: 56 TABLET | Refills: 0 | Status: SHIPPED | OUTPATIENT
Start: 2025-07-07 | End: 2025-08-04

## 2025-08-01 DIAGNOSIS — F41.1 GAD (GENERALIZED ANXIETY DISORDER): ICD-10-CM

## 2025-08-04 DIAGNOSIS — F90.2 ATTENTION DEFICIT HYPERACTIVITY DISORDER (ADHD), COMBINED TYPE: ICD-10-CM

## 2025-08-04 RX ORDER — DEXTROAMPHETAMINE SACCHARATE, AMPHETAMINE ASPARTATE, DEXTROAMPHETAMINE SULFATE AND AMPHETAMINE SULFATE 5; 5; 5; 5 MG/1; MG/1; MG/1; MG/1
20 TABLET ORAL 2 TIMES DAILY
Qty: 56 TABLET | Refills: 0 | Status: SHIPPED | OUTPATIENT
Start: 2025-08-04 | End: 2025-09-01

## 2025-08-04 RX ORDER — CLONAZEPAM 0.5 MG/1
0.5 TABLET ORAL 2 TIMES DAILY PRN
Qty: 56 TABLET | Refills: 0 | Status: SHIPPED | OUTPATIENT
Start: 2025-08-04

## 2025-08-11 ENCOUNTER — LABORATORY ENCOUNTER (OUTPATIENT)
Dept: LAB | Age: 35
End: 2025-08-11
Attending: FAMILY MEDICINE

## 2025-08-11 DIAGNOSIS — R73.01 IMPAIRED FASTING GLUCOSE: ICD-10-CM

## 2025-08-11 DIAGNOSIS — H05.20 OCULAR PROPTOSIS: ICD-10-CM

## 2025-08-11 LAB
EST. AVERAGE GLUCOSE BLD GHB EST-MCNC: 100 MG/DL (ref 68–126)
HBA1C MFR BLD: 5.1 % (ref ?–5.7)
T4 FREE SERPL-MCNC: 1.3 NG/DL (ref 0.8–1.7)
THYROGLOB SERPL-MCNC: 30 U/ML (ref ?–60)
THYROPEROXIDASE AB SERPL-ACNC: <28 U/ML (ref ?–60)
TSI SER-ACNC: 1.02 UIU/ML (ref 0.55–4.78)

## 2025-08-11 PROCEDURE — 36415 COLL VENOUS BLD VENIPUNCTURE: CPT

## 2025-08-11 PROCEDURE — 86800 THYROGLOBULIN ANTIBODY: CPT

## 2025-08-11 PROCEDURE — 83036 HEMOGLOBIN GLYCOSYLATED A1C: CPT

## 2025-08-11 PROCEDURE — 84439 ASSAY OF FREE THYROXINE: CPT

## 2025-08-11 PROCEDURE — 84443 ASSAY THYROID STIM HORMONE: CPT

## 2025-08-11 PROCEDURE — 86376 MICROSOMAL ANTIBODY EACH: CPT

## 2025-08-28 DIAGNOSIS — F90.2 ATTENTION DEFICIT HYPERACTIVITY DISORDER (ADHD), COMBINED TYPE: ICD-10-CM

## 2025-08-28 DIAGNOSIS — F41.1 GAD (GENERALIZED ANXIETY DISORDER): ICD-10-CM

## 2025-08-30 RX ORDER — CLONAZEPAM 0.5 MG/1
0.5 TABLET ORAL 2 TIMES DAILY PRN
Qty: 56 TABLET | Refills: 0 | Status: SHIPPED | OUTPATIENT
Start: 2025-08-30

## 2025-08-30 RX ORDER — DEXTROAMPHETAMINE SACCHARATE, AMPHETAMINE ASPARTATE, DEXTROAMPHETAMINE SULFATE AND AMPHETAMINE SULFATE 5; 5; 5; 5 MG/1; MG/1; MG/1; MG/1
20 TABLET ORAL 2 TIMES DAILY
Qty: 56 TABLET | Refills: 0 | Status: SHIPPED | OUTPATIENT
Start: 2025-08-30 | End: 2025-09-27

## (undated) NOTE — MR AVS SNAPSHOT
[facility]  [address] [place] [address] [id number] [phone]               Thank you for choosing us for your health care visit with Cristobal Guy. Elisha Listen, DO.   We are glad to serve you and happy to provide you with this sum This list is accurate as of: 5/12/17  2:46 PM.  Always use your most recent med list.                Citalopram Hydrobromide 40 MG Tabs   Take 1 tablet (40 mg total) by mouth daily. What changed:  Another medication with the same name was removed.  Contin drinks, candies and desserts   Eat plenty of low-fat dairy products High fat meats and dairy   Choose whole grain products Foods high in sodium   Water is best for hydration Fast food.    Eat at home when possible     Tips for increasing your physical activ